# Patient Record
Sex: FEMALE | Race: WHITE
[De-identification: names, ages, dates, MRNs, and addresses within clinical notes are randomized per-mention and may not be internally consistent; named-entity substitution may affect disease eponyms.]

---

## 2017-05-06 ENCOUNTER — HOSPITAL ENCOUNTER (EMERGENCY)
Dept: HOSPITAL 75 - ER | Age: 67
Discharge: HOME | End: 2017-05-06
Payer: COMMERCIAL

## 2017-05-06 VITALS — BODY MASS INDEX: 29.88 KG/M2 | HEIGHT: 64 IN | WEIGHT: 175 LBS

## 2017-05-06 VITALS — SYSTOLIC BLOOD PRESSURE: 139 MMHG | DIASTOLIC BLOOD PRESSURE: 71 MMHG

## 2017-05-06 DIAGNOSIS — T40.0X5A: ICD-10-CM

## 2017-05-06 DIAGNOSIS — F17.210: ICD-10-CM

## 2017-05-06 DIAGNOSIS — R42: Primary | ICD-10-CM

## 2017-05-06 DIAGNOSIS — Z79.82: ICD-10-CM

## 2017-05-06 DIAGNOSIS — Z79.899: ICD-10-CM

## 2017-05-06 DIAGNOSIS — R11.0: ICD-10-CM

## 2017-05-06 DIAGNOSIS — I10: ICD-10-CM

## 2017-05-06 LAB
ALBUMIN SERPL-MCNC: 4.3 G/DL (ref 3.2–4.5)
ALT SERPL-CCNC: 21 U/L (ref 0–55)
ANION GAP SERPL CALC-SCNC: 9 MMOL/L (ref 5–14)
AST SERPL-CCNC: 23 U/L (ref 5–34)
BASOPHILS # BLD AUTO: 0 10^3/UL (ref 0–0.1)
BASOPHILS NFR BLD AUTO: 0 % (ref 0–10)
BILIRUB SERPL-MCNC: 0.4 MG/DL (ref 0.1–1)
BILIRUB UR QL STRIP: NEGATIVE
BUN SERPL-MCNC: 19 MG/DL (ref 7–18)
BUN/CREAT SERPL: 26
CALCIUM SERPL-MCNC: 10 MG/DL (ref 8.5–10.1)
CHLORIDE SERPL-SCNC: 102 MMOL/L (ref 98–107)
CO2 SERPL-SCNC: 27 MMOL/L (ref 21–32)
CREAT SERPL-MCNC: 0.73 MG/DL (ref 0.6–1.3)
EOSINOPHIL # BLD AUTO: 0 10^3/UL (ref 0–0.3)
EOSINOPHIL NFR BLD AUTO: 0 % (ref 0–10)
ERYTHROCYTE [DISTWIDTH] IN BLOOD BY AUTOMATED COUNT: 14.6 % (ref 10–14.5)
GFR SERPLBLD BASED ON 1.73 SQ M-ARVRAT: > 60 ML/MIN
GLUCOSE SERPL-MCNC: 118 MG/DL (ref 70–105)
KETONES UR QL STRIP: NEGATIVE
LEUKOCYTE ESTERASE UR QL STRIP: (no result)
LYMPHOCYTES # BLD AUTO: 1.1 X 10^3 (ref 1–4)
LYMPHOCYTES NFR BLD AUTO: 13 % (ref 12–44)
MCH RBC QN AUTO: 29 PG (ref 25–34)
MCHC RBC AUTO-ENTMCNC: 34 G/DL (ref 32–36)
MCV RBC AUTO: 86 FL (ref 80–99)
MONOCYTES # BLD AUTO: 0.3 X 10^3 (ref 0–1)
MONOCYTES NFR BLD AUTO: 3 % (ref 0–12)
NEUTROPHILS # BLD AUTO: 6.9 X 10^3 (ref 1.8–7.8)
NEUTROPHILS NFR BLD AUTO: 83 % (ref 42–75)
NITRITE UR QL STRIP: NEGATIVE
PH UR STRIP: 6 [PH] (ref 5–9)
PLATELET # BLD: 211 10^3/UL (ref 130–400)
PMV BLD AUTO: 9.8 FL (ref 7.4–10.4)
POTASSIUM SERPL-SCNC: 4.2 MMOL/L (ref 3.6–5)
PROT SERPL-MCNC: 7.3 G/DL (ref 6.4–8.2)
PROT UR QL STRIP: NEGATIVE
RBC # BLD AUTO: 5.23 10^6/UL (ref 4.35–5.85)
SODIUM SERPL-SCNC: 138 MMOL/L (ref 135–145)
SP GR UR STRIP: 1.02 (ref 1.02–1.02)
SQUAMOUS #/AREA URNS HPF: (no result) /HPF
UROBILINOGEN UR-MCNC: NORMAL MG/DL
WBC # BLD AUTO: 8.3 10^3/UL (ref 4.3–11)
WBC #/AREA URNS HPF: (no result) /HPF

## 2017-05-06 PROCEDURE — 81000 URINALYSIS NONAUTO W/SCOPE: CPT

## 2017-05-06 PROCEDURE — 80053 COMPREHEN METABOLIC PANEL: CPT

## 2017-05-06 PROCEDURE — 73630 X-RAY EXAM OF FOOT: CPT

## 2017-05-06 PROCEDURE — 85025 COMPLETE CBC W/AUTO DIFF WBC: CPT

## 2017-05-06 PROCEDURE — 99283 EMERGENCY DEPT VISIT LOW MDM: CPT

## 2017-05-06 PROCEDURE — 36415 COLL VENOUS BLD VENIPUNCTURE: CPT

## 2017-05-06 NOTE — DIAGNOSTIC IMAGING REPORT
INDICATION: Pain.



3 views were obtained.



FINDINGS: The bones are osteopenic. There is a questionable faint

lucency in the proximal fifth metatarsal. Nondisplaced occult

fracture cannot be excluded. There is also slight cortical

irregularity involving the distal aspect of the proximal phalanx

of the second toe. A nondisplaced fracture in this region cannot

be excluded. There is moderate osteoarthritic change in the first

metatarsophalangeal joint. Soft tissues are unremarkable.



IMPRESSION: Faint lucency to the proximal fifth metatarsal. While

this may be secondary to osteopenia, possibility of an occult

fracture cannot be excluded. Additionally, there is slight

cortical irregularity in the distal aspect of the proximal

phalanx of the left second toe. Nondisplaced fracture in this

region cannot be excluded. Recommend clinical correlation.



Dictated by: 



  Dictated on workstation # [id number]

## 2017-05-06 NOTE — ED GENERAL
General


Chief Complaint:  General Problems/Pain


Stated Complaint:  DIZZINESS/NAUSEA


Nursing Triage Note:  


States that she started taking tamadol yesterday for a fx foot.  States today 


she has become dizzy and nauseated.  her head "just doesn't feel right"


Nursing Sepsis Screen:  No Definite Risk


Source of Information:  Patient, EMS


Exam Limitations:  No Limitations





History of Present Illness


Time Seen by Provider:  16:50


Initial Comments


To ER with reports of dizziness and nausea after taking an Ultram this morning 

for a foot fracture that she sustained on the 17th of last month.  This report 

is from EMS, I spent nearly 10 minutes with the patient and was still unable to 

determine from my conversation with her what her chief complaint is today.


Timing/Duration:  1-2 Days


Severity:  Moderate





Allergies and Home Medications


Allergies


Coded Allergies:  


     Penicillins (Unverified  Allergy, Unknown, PASSED OUT, 5/6/17)





Home Medications


Acetaminophen 500 Mg Tablet, 500 MG PO Q6H PRN, (Reported)


Aspirin 81 Mg Tabec, 81 MG PO DAILY, (Reported)


Atorvastatin Calcium 40 Mg Tablet, #30 (Reported)


Lorazepam 1 Mg Tablet, 1 EACH PO HS PRN, (Reported)


Metoprolol Succinate 25 Mg Tab.sr.24h, 1 EACH PO DAILY, (Reported)


Mirtazapine 15 Mg Tablet, 30 MG PO HS, (Reported)


Quetiapine Fumarate 50 Mg Tablet, 50 MG PO TID, (Reported)


   ONE IN AM, ONE IN AFTERNOON AND TWO AT HS 


Tramadol HCl 50 Mg Tablet, #30 (Reported)





Constitutional:  see HPI


EENTM:  see HPI


Respiratory:  no symptoms reported


Cardiovascular:  no symptoms reported


Gastrointestinal:  nausea


Genitourinary:  no symptoms reported


Musculoskeletal:  no symptoms reported


Skin:  no symptoms reported


Psychiatric/Neurological:  No Symptoms Reported


Hematologic/Lymphatic:  No Symptoms Reported





Past Medical-Social-Family Hx


Patient Social History


Alcohol Use:  Occasionally Uses


Recreational Drug Use:  No


Smoking Status:  Current Everyday Smoker


Type Used:  Cigarettes


2nd Hand Smoke Exposure:  No


Recent Foreign Travel:  No


Contact w/Someone Who Travel:  No


Recent Infectious Disease Expo:  No


Recent Hopitalizations:  No (RIGHT HIP)





Immunizations Up To Date


Tetanus Booster (TDap):  Less than 5yrs


PED Vaccines UTD:  Yes





Surgeries


HX Surgeries:  Yes


Surgeries:  Breast, Orthopedic





Respiratory


Hx Respiratory Disorders:  No





Cardiovascular


Hx Cardiac Disorders:  Yes


Cardiac Disorders:  High Cholesterol, Hypertension





Neurological


Hx Neurological Disorders:  No





Reproductive System


Hx Reproductive Disorders:  No


Sexually Transmitted Disease:  No


HIV/AIDS:  No





Genitourinary


Hx Genitourinary Disorders:  No





Gastrointestinal


Hx Gastrointestinal Disorders:  No





Musculoskeletal


Hx Musculoskeletal Disorders:  Yes (OSTEOARTHRITIS)





Endocrine


Hx Endocrine Disorders:  No





HEENT


HX ENT Disorders:  No





Cancer


Hx Cancer:  No





Psychosocial


Hx Psychiatric Problems:  Yes


Behavioral Health Disorders:  Anxiety





Integumentary


HX Skin/Integumentary Disorder:  No





Blood Transfusions


Hx Blood Disorders:  No





Physical Exam


Vital Signs





Vital Sign - Last 12Hours








 5/6/17





 16:32


 


Temp 97.3


 


Pulse 71


 


Resp 18


 


B/P (MAP) 137/94


 


Pulse Ox 99





Capillary Refill : Less Than 3 Seconds


General Appearance:  No Apparent Distress, WD/WN


Eyes:  Bilateral Eye EOMI, Bilateral Eye Normal Inspection, Bilateral Eye PERRL


HEENT:  PERRL/EOMI, TMs Normal


Neck:  Full Range of Motion, Normal Inspection


Respiratory:  No Accessory Muscle Use, No Respiratory Distress


Cardiovascular:  Regular Rate, Rhythm, Normal Peripheral Pulses


Gastrointestinal:  Normal Bowel Sounds, Non Tender, Soft


Extremity:  Normal Capillary Refill, Non Tender, No Calf Tenderness


Neurologic/Psychiatric:  Alert, Oriented x3, No Motor/Sensory Deficits


Skin:  Normal Color, Warm/Dry





Progress/Results/Core Measures


Results/Orders


Lab Results





Laboratory Tests








Test


  5/6/17


17:05 5/6/17


17:11 Range/Units


 


 


White Blood Count


  8.3 


  


  4.3-11.0


10^3/uL


 


Red Blood Count


  5.23 


  


  4.35-5.85


10^6/uL


 


Hemoglobin 15.2   11.5-16.0  G/DL


 


Hematocrit 45   35-52  %


 


Mean Corpuscular Volume 86   80-99  FL


 


Mean Corpuscular Hemoglobin 29   25-34  PG


 


Mean Corpuscular Hemoglobin


Concent 34 


  


  32-36  G/DL


 


 


Red Cell Distribution Width 14.6 H  10.0-14.5  %


 


Platelet Count


  211 


  


  130-400


10^3/uL


 


Mean Platelet Volume 9.8   7.4-10.4  FL


 


Neutrophils (%) (Auto) 83 H  42-75  %


 


Lymphocytes (%) (Auto) 13   12-44  %


 


Monocytes (%) (Auto) 3   0-12  %


 


Eosinophils (%) (Auto) 0   0-10  %


 


Basophils (%) (Auto) 0   0-10  %


 


Neutrophils # (Auto) 6.9   1.8-7.8  X 10^3


 


Lymphocytes # (Auto) 1.1   1.0-4.0  X 10^3


 


Monocytes # (Auto) 0.3   0.0-1.0  X 10^3


 


Eosinophils # (Auto)


  0.0 


  


  0.0-0.3


10^3/uL


 


Basophils # (Auto)


  0.0 


  


  0.0-0.1


10^3/uL


 


Urine Color  YELLOW   


 


Urine Clarity


  


  SLIGHTLY


CLOUDY  


 


 


Urine pH  6  5-9  


 


Urine Specific Gravity  1.020  1.016-1.022  


 


Urine Protein  NEGATIVE  NEGATIVE  


 


Urine Glucose (UA)  NEGATIVE  NEGATIVE  


 


Urine Ketones  NEGATIVE  NEGATIVE  


 


Urine Nitrite  NEGATIVE  NEGATIVE  


 


Urine Bilirubin  NEGATIVE  NEGATIVE  


 


Urine Urobilinogen  NORMAL  NORMAL  MG/DL


 


Urine Leukocyte Esterase  1+ H NEGATIVE  


 


Urine RBC (Auto)  NEGATIVE  NEGATIVE  


 


Urine RBC  NONE   /HPF


 


Urine WBC  NONE   /HPF


 


Urine Squamous Epithelial


Cells 


  2-5 


   /HPF


 


 


Urine Crystals  NONE   /LPF


 


Urine Bacteria  TRACE   /HPF


 


Urine Casts  NONE   /LPF


 


Urine Mucus  NEGATIVE   /LPF


 


Urine Culture Indicated  NO   








My Orders





Orders - BRANDEN MIDDLETON APRN


Cbc With Automated Diff (5/6/17 16:47)


Comprehensive Metabolic Panel (5/6/17 16:47)


Ua Culture If Indicated (5/6/17 16:47)


Ondansetron  Oral Dissolve Tab (Zofran (5/6/17 17:00)


Foot, Left, 3 Views (5/6/17 17:02)





Medications Given in ED





Current Medications








 Medications  Dose


 Ordered  Sig/Yeimi


 Route  Start Time


 Stop Time Status Last Admin


Dose Admin


 


 Ondansetron HCl  4 mg  ONCE  ONCE


 PO  5/6/17 17:00


 5/6/17 17:01 DC 5/6/17 16:57


4 MG








Vital Signs/I&O





Vital Sign - Last 12Hours








 5/6/17





 16:32


 


Temp 97.3


 


Pulse 71


 


Resp 18


 


B/P (MAP) 137/94


 


Pulse Ox 99














Blood Pressure Mean:  108











Departure


Impression


Impression:  


 Primary Impression:  


 Medication adverse effect


Disposition:  01 HOME, SELF-CARE


Condition:  Stable





Departure-Patient Inst.


Decision time for Depature:  17:36


Referrals:  


DAVID MCGINNIS (PCP)


Primary Care Physician








BRISEYDA LOPEZ MD (Family)


Primary Care Physician


Patient Instructions:  MEDICATION REACTION





Add. Discharge Instructions:  


1.  Return to ER for any concerns


2.  Do not take any more of the Ultram.  All discharge instructions reviewed 

with patient and/or family. Voiced understanding.











BRANDEN MIDDLETON APRN May 6, 2017 16:51

## 2017-05-19 ENCOUNTER — HOSPITAL ENCOUNTER (EMERGENCY)
Dept: HOSPITAL 75 - ER | Age: 67
Discharge: LEFT BEFORE DISCHARGE | End: 2017-05-19
Payer: MEDICARE

## 2017-05-19 VITALS — WEIGHT: 180 LBS | BODY MASS INDEX: 31.89 KG/M2 | HEIGHT: 63 IN
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
## 2017-05-19 DIAGNOSIS — Z79.899: ICD-10-CM

## 2017-05-19 DIAGNOSIS — F10.129: ICD-10-CM

## 2017-05-19 DIAGNOSIS — F17.210: ICD-10-CM

## 2017-05-19 DIAGNOSIS — S92.352D: Primary | ICD-10-CM

## 2017-05-19 DIAGNOSIS — I10: ICD-10-CM

## 2017-05-19 DIAGNOSIS — M19.072: ICD-10-CM
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
## 2017-05-19 DIAGNOSIS — Z79.82: ICD-10-CM

## 2017-05-19 LAB
ALBUMIN SERPL-MCNC: 4.1 G/DL (ref 3.2–4.5)
ALT SERPL-CCNC: 16 U/L (ref 0–55)
ANION GAP SERPL CALC-SCNC: 17 MMOL/L (ref 5–14)
AST SERPL-CCNC: 21 U/L (ref 5–34)
BASOPHILS # BLD AUTO: 0 10^3/UL (ref 0–0.1)
BASOPHILS NFR BLD AUTO: 0 % (ref 0–10)
BILIRUB SERPL-MCNC: 0.2 MG/DL (ref 0.1–1)
BUN SERPL-MCNC: 15 MG/DL (ref 7–18)
BUN/CREAT SERPL: 22
CALCIUM SERPL-MCNC: 9.3 MG/DL (ref 8.5–10.1)
CHLORIDE SERPL-SCNC: 108 MMOL/L (ref 98–107)
CO2 SERPL-SCNC: 18 MMOL/L (ref 21–32)
CREAT SERPL-MCNC: 0.67 MG/DL (ref 0.6–1.3)
EOSINOPHIL # BLD AUTO: 0.2 10^3/UL (ref 0–0.3)
EOSINOPHIL NFR BLD AUTO: 3 % (ref 0–10)
ERYTHROCYTE [DISTWIDTH] IN BLOOD BY AUTOMATED COUNT: 14.8 % (ref 10–14.5)
ETHANOL SERPL-MCNC: 246 MG/DL (ref ?–10)
GFR SERPLBLD BASED ON 1.73 SQ M-ARVRAT: > 60 ML/MIN
GLUCOSE SERPL-MCNC: 114 MG/DL (ref 70–105)
LYMPHOCYTES # BLD AUTO: 2.7 X 10^3 (ref 1–4)
LYMPHOCYTES NFR BLD AUTO: 40 % (ref 12–44)
MCH RBC QN AUTO: 29 PG (ref 25–34)
MCHC RBC AUTO-ENTMCNC: 33 G/DL (ref 32–36)
MCV RBC AUTO: 86 FL (ref 80–99)
MONOCYTES # BLD AUTO: 0.4 X 10^3 (ref 0–1)
MONOCYTES NFR BLD AUTO: 6 % (ref 0–12)
NEUTROPHILS # BLD AUTO: 3.4 X 10^3 (ref 1.8–7.8)
NEUTROPHILS NFR BLD AUTO: 50 % (ref 42–75)
PLATELET # BLD: 233 10^3/UL (ref 130–400)
PMV BLD AUTO: 9.7 FL (ref 7.4–10.4)
POTASSIUM SERPL-SCNC: 3.4 MMOL/L (ref 3.6–5)
PROT SERPL-MCNC: 6.7 G/DL (ref 6.4–8.2)
RBC # BLD AUTO: 5.17 10^6/UL (ref 4.35–5.85)
SODIUM SERPL-SCNC: 143 MMOL/L (ref 135–145)
WBC # BLD AUTO: 6.8 10^3/UL (ref 4.3–11)

## 2017-05-19 PROCEDURE — 80320 DRUG SCREEN QUANTALCOHOLS: CPT

## 2017-05-19 PROCEDURE — 73630 X-RAY EXAM OF FOOT: CPT

## 2017-05-19 PROCEDURE — 85025 COMPLETE CBC W/AUTO DIFF WBC: CPT

## 2017-05-19 PROCEDURE — 80053 COMPREHEN METABOLIC PANEL: CPT

## 2017-05-19 PROCEDURE — 36415 COLL VENOUS BLD VENIPUNCTURE: CPT

## 2017-05-19 PROCEDURE — 99283 EMERGENCY DEPT VISIT LOW MDM: CPT

## 2017-05-19 NOTE — ED LOWER EXTREMITY
General


Chief Complaint:  Substance Abuse


Stated Complaint:  L FOOT PAIN


Source:  patient


Exam Limitations:  no limitations





History of Present Illness


Time seen by provider:  22:00


Initial Comments


Here with report of left foot pain.  States that the medicine that she was 

given for her fracture is not working.  She admits to drinking 3 drinks tonight 

but told EMS that she had 6.  She does admit to being drunk.  She states that 

the tramadol makes her sick and she cannot take it and has not for 2 weeks.  

She apparently broke her foot last month.  She is under the care of Dr. Whitaker.  

She is supposed to wear a boot but does not have that with her currently.  She 

appears to have been walking on her foot.  Denies other injury or concerns.


Onset:  other (3-4 weeks)


Severity:  moderate


Pain/Injury Location:  left foot


Method of Injury:  unknown


Modifying Factors:  Improves With Immobilization, Worse With Movement





Allergies and Home Medications


Allergies


Coded Allergies:  


     Penicillins (Unverified  Allergy, Unknown, PASSED OUT, 5/6/17)





Home Medications


Acetaminophen 500 Mg Tablet, 500 MG PO Q6H PRN, (Reported)


Aspirin 81 Mg Tabec, 81 MG PO DAILY, (Reported)


Atorvastatin Calcium 40 Mg Tablet, #30 (Reported)


Lorazepam 1 Mg Tablet, 1 EACH PO HS PRN, (Reported)


Metoprolol Succinate 25 Mg Tab.sr.24h, 1 EACH PO DAILY, (Reported)


Mirtazapine 15 Mg Tablet, 30 MG PO HS, (Reported)


Quetiapine Fumarate 50 Mg Tablet, 50 MG PO TID, (Reported)


   ONE IN AM, ONE IN AFTERNOON AND TWO AT HS 


Tramadol HCl 50 Mg Tablet, #30 (Reported)





Constitutional:  see HPI, No chills, No fever


Respiratory:  cough, No short of breath


Cardiovascular:  no symptoms reported


Musculoskeletal:  see HPI, No joint swelling, other (left foot pain)


Skin:  no symptoms reported


Psychiatric/Neurological:  Anxiety





Past Medical-Social-Family Hx


Patient Social History


Alcohol Use:  Occasionally Uses


Recreational Drug Use:  No


Smoking Status:  Current Everyday Smoker


Type Used:  Cigarettes


2nd Hand Smoke Exposure:  No


Recent Hopitalizations:  No





Immunizations Up To Date


Tetanus Booster (TDap):  Less than 5yrs


PED Vaccines UTD:  Yes





Surgeries


HX Surgeries:  Yes


Surgeries:  Breast, Orthopedic





Respiratory


Hx Respiratory Disorders:  No





Cardiovascular


Hx Cardiac Disorders:  Yes


Cardiac Disorders:  High Cholesterol, Hypertension





Neurological


Hx Neurological Disorders:  No





Reproductive System


Hx Reproductive Disorders:  No


Sexually Transmitted Disease:  No


HIV/AIDS:  No





Genitourinary


Hx Genitourinary Disorders:  No





Gastrointestinal


Hx Gastrointestinal Disorders:  No





Musculoskeletal


Hx Musculoskeletal Disorders:  Yes (OSTEOARTHRITIS)





Endocrine


Hx Endocrine Disorders:  No





HEENT


HX ENT Disorders:  No





Cancer


Hx Cancer:  No





Psychosocial


Hx Psychiatric Problems:  Yes


Behavioral Health Disorders:  Anxiety





Integumentary


HX Skin/Integumentary Disorder:  No





Blood Transfusions


Hx Blood Disorders:  No





Reviewed Nursing Assessment


Reviewed/Agree w Nursing PMH:  Yes





Physical Exam


Vital Signs





Vital Sign - Last 12Hours








 5/19/17





 22:01


 


Temp 97.1


 


Pulse 73


 


Resp 28


 


B/P (MAP) 124/74


 


Pulse Ox 100


 


O2 Delivery Room Air





Capillary Refill :


General Appearance:  WD/WN, other (animated and somewhat anxious.  Slurred 

speech.)


Neck:  full range of motion, supple


Cardiovascular:  regular rate, rhythm, no murmur


Respiratory:  lungs clear, normal breath sounds


Gastrointestinal:  non tender, soft


Back:  normal inspection, no CVA tenderness, no vertebral tenderness


Feet:  right foot non-tender, right foot normal inspection, right foot normal 

range of motion, left foot pain (lateral aspect), left foot other (no swelling 

or wounds noted.)


Neurologic/Psychiatric:  alert, other (slurred speech but follows commands.  

Smells of alcohol.)


Skin:  normal color, warm/dry





Progress/Results/Core Measures


Results/Orders


Lab Results





Laboratory Tests








Test


  5/19/17


22:20 Range/Units


 


 


White Blood Count


  6.8 


  4.3-11.0


10^3/uL


 


Red Blood Count


  5.17 


  4.35-5.85


10^6/uL


 


Hemoglobin 14.8  11.5-16.0  G/DL


 


Hematocrit 44  35-52  %


 


Mean Corpuscular Volume 86  80-99  FL


 


Mean Corpuscular Hemoglobin 29  25-34  PG


 


Mean Corpuscular Hemoglobin


Concent 33 


  32-36  G/DL


 


 


Red Cell Distribution Width 14.8 H 10.0-14.5  %


 


Platelet Count


  233 


  130-400


10^3/uL


 


Mean Platelet Volume 9.7  7.4-10.4  FL


 


Neutrophils (%) (Auto) 50  42-75  %


 


Lymphocytes (%) (Auto) 40  12-44  %


 


Monocytes (%) (Auto) 6  0-12  %


 


Eosinophils (%) (Auto) 3  0-10  %


 


Basophils (%) (Auto) 0  0-10  %


 


Neutrophils # (Auto) 3.4  1.8-7.8  X 10^3


 


Lymphocytes # (Auto) 2.7  1.0-4.0  X 10^3


 


Monocytes # (Auto) 0.4  0.0-1.0  X 10^3


 


Eosinophils # (Auto)


  0.2 


  0.0-0.3


10^3/uL


 


Basophils # (Auto)


  0.0 


  0.0-0.1


10^3/uL


 


Sodium Level 143  135-145  MMOL/L


 


Potassium Level 3.4 L 3.6-5.0  MMOL/L


 


Chloride Level 108 H   MMOL/L


 


Carbon Dioxide Level 18 L 21-32  MMOL/L


 


Anion Gap 17 H 5-14  MMOL/L


 


Blood Urea Nitrogen 15  7-18  MG/DL


 


Creatinine


  0.67 


  0.60-1.30


MG/DL


 


Estimat Glomerular Filtration


Rate > 60 


   


 


 


BUN/Creatinine Ratio 22   


 


Glucose Level 114 H   MG/DL


 


Calcium Level 9.3  8.5-10.1  MG/DL


 


Total Bilirubin 0.2  0.1-1.0  MG/DL


 


Aspartate Amino Transf


(AST/SGOT) 21 


  5-34  U/L


 


 


Alanine Aminotransferase


(ALT/SGPT) 16 


  0-55  U/L


 


 


Alkaline Phosphatase 88    U/L


 


Total Protein 6.7  6.4-8.2  G/DL


 


Albumin 4.1  3.2-4.5  G/DL


 


Serum Alcohol 246 H <10  MG/DL








My Orders





Orders - OSMAR GUTIERREZ MD


Alcohol (5/19/17 22:08)


Cbc With Automated Diff (5/19/17 22:08)


Comprehensive Metabolic Panel (5/19/17 22:08)


Foot, Left, 3 Views (5/19/17 22:08)





Vital Signs/I&O





Vital Sign - Last 12Hours








 5/19/17





 22:01


 


Temp 97.1


 


Pulse 73


 


Resp 28


 


B/P (MAP) 124/74


 


Pulse Ox 100


 


O2 Delivery Room Air








Progress Note :  


Progress Note


Seen and evaluated.  X-ray left foot.  We will check alcohol level and basic 

labs.  Monitor patient.  2140: Patient does not want to stay and is leaving 

AGAINST MEDICAL ADVICE.  Discharge instructions given.  She was informed to 

follow-up with Dr. Whitaker.  It does appear that she still has a fracture to the 

base of the left fifth metatarsal.  Patient informed.  She has boot at home and 

she was informed to wear this at all times while walking.





Departure


Impression


Impression:  


 Primary Impression:  


 Foot fracture, left


 Qualified Codes:  S92.902G - Unspecified fracture of left foot, subsequent 

encounter for fracture with delayed healing


Disposition:  


Condition:  Stable





Departure-Patient Inst.


Decision time for Depature:  22:44


Referrals:  


[facility] (PCP/Family)


Primary Care Physician








JACKELINE WHITAKER DPM


Patient Instructions:  ALCOHOL AND SUBSTANCE ABUSE





Add. Discharge Instructions:  


All discharge instructions reviewed with patient and/or family. Voiced 

understanding.





You may take Tylenol or ibuprofen per package directions as needed for pain.  

Use walking boot at all times while walking.  Follow-up with Dr. Whitaker on 

Monday morning.  Call his office for appointment.  Return for worse pain or 

other concerns as needed.











OSMAR GUTIERREZ MD May 19, 2017 22:22

## 2017-05-20 NOTE — DIAGNOSTIC IMAGING REPORT
INDICATION: Pain



COMPARISON: 5/6/2017



FINDINGS: 

Three views of the left foot are obtained. There has been no

significant interval change in alignment or position of the

fracture through the proximal fifth metatarsal. Fracture line is

slightly more distinct today, likely due to bony resorption at

the fracture site. No significant callus is demonstrated. No new

fracture is seen. Advanced degenerative change at the first MTP

joint with significant joint space narrowing, subchondral

irregularity and spurring appear similar to the recent prior

study. No significant abnormalities demonstrated.



IMPRESSION:

There is bony resorption about the fracture site through the

proximal fifth metatarsal. This metatarsal fractures otherwise

unchanged in appearance. Severe degenerative changes  at the

first MTP joint are stable. No new abnormality is seen when

compared to the recent prior study.



Dictated by: 



  Dictated on workstation # [id number]

## 2017-07-09 ENCOUNTER — HOSPITAL ENCOUNTER (EMERGENCY)
Dept: HOSPITAL 75 - ER | Age: 67
Discharge: HOME | End: 2017-07-09
Payer: MEDICARE

## 2017-07-09 VITALS — BODY MASS INDEX: 30.12 KG/M2 | HEIGHT: 63 IN | WEIGHT: 170 LBS

## 2017-07-09 VITALS — SYSTOLIC BLOOD PRESSURE: 137 MMHG | DIASTOLIC BLOOD PRESSURE: 73 MMHG

## 2017-07-09 LAB
ALBUMIN SERPL-MCNC: 4 GM/DL (ref 3.2–4.5)
ALT SERPL-CCNC: 25 U/L (ref 0–55)
ANION GAP SERPL CALC-SCNC: 12 MMOL/L (ref 5–14)
AST SERPL-CCNC: 27 U/L (ref 5–34)
BASOPHILS # BLD AUTO: 0 10^3/UL (ref 0–0.1)
BASOPHILS NFR BLD AUTO: 0 % (ref 0–10)
BILIRUB SERPL-MCNC: 0.4 MG/DL (ref 0.1–1)
BILIRUB UR QL STRIP: NEGATIVE
BUN SERPL-MCNC: 21 MG/DL (ref 7–18)
BUN/CREAT SERPL: 28
CALCIUM SERPL-MCNC: 9.3 MG/DL (ref 8.5–10.1)
CHLORIDE SERPL-SCNC: 107 MMOL/L (ref 98–107)
CO2 SERPL-SCNC: 19 MMOL/L (ref 21–32)
CREAT SERPL-MCNC: 0.76 MG/DL (ref 0.6–1.3)
EOSINOPHIL # BLD AUTO: 0 10^3/UL (ref 0–0.3)
EOSINOPHIL NFR BLD AUTO: 0 % (ref 0–10)
ERYTHROCYTE [DISTWIDTH] IN BLOOD BY AUTOMATED COUNT: 15.6 % (ref 10–14.5)
GFR SERPLBLD BASED ON 1.73 SQ M-ARVRAT: > 60 ML/MIN
GLUCOSE SERPL-MCNC: 108 MG/DL (ref 70–105)
KETONES UR QL STRIP: NEGATIVE
LEUKOCYTE ESTERASE UR QL STRIP: (no result)
LYMPHOCYTES # BLD AUTO: 0.9 X 10^3 (ref 1–4)
LYMPHOCYTES NFR BLD AUTO: 8 % (ref 12–44)
MCH RBC QN AUTO: 28 PG (ref 25–34)
MCHC RBC AUTO-ENTMCNC: 33 G/DL (ref 32–36)
MCV RBC AUTO: 85 FL (ref 80–99)
MONOCYTES # BLD AUTO: 0.2 X 10^3 (ref 0–1)
MONOCYTES NFR BLD AUTO: 2 % (ref 0–12)
NEUTROPHILS # BLD AUTO: 9.9 X 10^3 (ref 1.8–7.8)
NEUTROPHILS NFR BLD AUTO: 89 % (ref 42–75)
NEUTS BAND NFR BLD MANUAL: 91 %
NITRITE UR QL STRIP: NEGATIVE
PH UR STRIP: 5 [PH] (ref 5–9)
PLATELET # BLD: 234 10^3/UL (ref 130–400)
PMV BLD AUTO: 9.7 FL (ref 7.4–10.4)
POTASSIUM SERPL-SCNC: 4.1 MMOL/L (ref 3.6–5)
PROT SERPL-MCNC: 7 GM/DL (ref 6.4–8.2)
PROT UR QL STRIP: NEGATIVE
RBC # BLD AUTO: 5.52 10^6/UL (ref 4.35–5.85)
SODIUM SERPL-SCNC: 138 MMOL/L (ref 135–145)
SP GR UR STRIP: 1.02 (ref 1.02–1.02)
SQUAMOUS #/AREA URNS HPF: (no result) /HPF
UROBILINOGEN UR-MCNC: NORMAL MG/DL
VARIANT LYMPHS NFR BLD MANUAL: 6 %
WBC # BLD AUTO: 11.1 10^3/UL (ref 4.3–11)
WBC #/AREA URNS HPF: (no result) /HPF

## 2017-07-09 PROCEDURE — 36415 COLL VENOUS BLD VENIPUNCTURE: CPT

## 2017-07-09 PROCEDURE — 80053 COMPREHEN METABOLIC PANEL: CPT

## 2017-07-09 PROCEDURE — 85007 BL SMEAR W/DIFF WBC COUNT: CPT

## 2017-07-09 PROCEDURE — 81000 URINALYSIS NONAUTO W/SCOPE: CPT

## 2017-07-09 PROCEDURE — 96360 HYDRATION IV INFUSION INIT: CPT

## 2017-07-09 PROCEDURE — 85027 COMPLETE CBC AUTOMATED: CPT

## 2017-07-09 NOTE — ED ABDOMINAL PAIN
General


Chief Complaint:  Abdominal/GI Problems


Stated Complaint:  DIARRHEA/DIZZINESS/NAUSEA/BLOATING


Nursing Triage Note:  


ARRIVED VIA AMB TO ROOM 09 WITHOUT DIFFICULTY. COMPLAINS OF DIARRHEA X2 DAYS.  


STATES SHE HAS BEEN SPRAYING ORTHO HOME DEFENSE FOR COCKROACHES AND WONDERS IF 


IT IS FROM THAT. PT STATES SHE IS ALSO BLOATED.


Sepsis Screen:  No Definite Risk


Source of Information:  Patient


Exam Limitations:  No Limitations





History of Present Illness


Time Seen By Provider:  14:00


Initial Comments


Here with report of diarrhea multiple times daily for the last 3 days.  She was 

a little concerned because she has been spreading for cockroaches at her house 

and she was worried that she may have had some reaction to the bug spray.  She 

said Imodium has not helped.  She is very active and animated and in no other 

distress otherwise.  Denies blood in her urine or stool.  States that water is 

going right through her.


Timing/Duration:  2-3 Days


Severity/Quality:  Moderate, Cramping


Location:  Generalized Abdomen


Modifying Factors:  Worsens With Eating


Associated Symptoms:  No Back Pain, No Chest Pain, No Fever/Chills, No Fatigue, 

No Nausea/Vomiting, No Weakness





Allergies and Home Medications


Allergies


Coded Allergies:  


     Penicillins (Unverified  Allergy, Unknown, PASSED OUT, 5/6/17)





Home Medications


Acetaminophen 500 Mg Tablet, 500 MG PO Q6H PRN, (Reported)


Aspirin 81 Mg Tabec, 81 MG PO DAILY, (Reported)


Atorvastatin Calcium 40 Mg Tablet, #30 (Reported)


Lorazepam 1 Mg Tablet, 1 EACH PO HS PRN, (Reported)


Metoprolol Succinate 25 Mg Tab.sr.24h, 1 EACH PO DAILY, (Reported)


Mirtazapine 15 Mg Tablet, 30 MG PO HS, (Reported)


Quetiapine Fumarate 50 Mg Tablet, 50 MG PO TID, (Reported)


   ONE IN AM, ONE IN AFTERNOON AND TWO AT HS 


Tramadol HCl 50 Mg Tablet, #30 (Reported)





Review of Systems


Constitutional:  see HPI, No chills, No fever


EENTM:  No Symptoms Reported


Respiratory:  No Symptoms Reported


Cardiovascular:  No Symptoms Reported


Gastrointestinal:  See HPI, Abdominal Pain, Diarrhea, Denies Rectal Bleeding, 

Denies Vomiting


Genitourinary:  No Symptoms Reported


Musculoskeletal:  no symptoms reported





All Other Systems Reviewed


Negative Unless Noted:  Yes





Past Medical-Social-Family Hx


Patient Social History


Alcohol Use:  Rarely Uses


Recreational Drug Use:  No


Smoking Status:  Current Everyday Smoker


Type Used:  Cigarettes


2nd Hand Smoke Exposure:  No


Recent Foreign Travel:  No


Contact w/Someone Who Travel:  No


Recent Infectious Disease Expo:  No


Recent Hopitalizations:  No





Immunizations Up To Date


Tetanus Booster (TDap):  Less than 5yrs


PED Vaccines UTD:  Yes





Surgeries


HX Surgeries:  Yes


Surgeries:  Breast, Orthopedic





Respiratory


Hx Respiratory Disorders:  No





Cardiovascular


Hx Cardiac Disorders:  Yes


Cardiac Disorders:  High Cholesterol, Hypertension





Neurological


Hx Neurological Disorders:  No





Reproductive System


Hx Reproductive Disorders:  No


Sexually Transmitted Disease:  No


HIV/AIDS:  No





Genitourinary


Hx Genitourinary Disorders:  No





Gastrointestinal


Hx Gastrointestinal Disorders:  No





Musculoskeletal


Hx Musculoskeletal Disorders:  Yes (OSTEOARTHRITIS)





Endocrine


Hx Endocrine Disorders:  No





HEENT


HX ENT Disorders:  No





Cancer


Hx Cancer:  No





Psychosocial


Hx Psychiatric Problems:  Yes


Behavioral Health Disorders:  Anxiety





Integumentary


HX Skin/Integumentary Disorder:  No





Blood Transfusions


Hx Blood Disorders:  No





Reviewed Nursing Assessment


Reviewed/Agree w Nursing PMH:  Yes





Family Medical History


Significant Family History:  No Pertinent Family Hx





Physical Exam


Vital Signs





 VS - Last 72 Hours, by Label








 7/9/17





 13:40


 


Temp 98.0


 


Pulse 74


 


Resp 16


 


B/P (MAP) 132/79


 


Pulse Ox 96


 


O2 Delivery Room Air





Capillary Refill : Less Than 3 Seconds


General Appearance:  WD/WN, no apparent distress


HEENT:  PERRL/EOMI, pharynx normal


Neck:  full range of motion, supple


Respiratory:  lungs clear, normal breath sounds


Cardiovascular:  regular rate, rhythm, no murmur


Peripheral Pulses:  2+ Dorsalis Pedis (R), 2+ Left Dors-Pedis (L), 2+ Radial 

Pulses (R), 2+ Radial Pulses (L)


Gastrointestinal:  non tender, soft


Extremities:  non-tender, normal inspection


Back:  normal inspection, no CVA tenderness, no vertebral tenderness


Neurologic/Psychiatric:  alert, oriented x 3


Skin:  normal color, warm/dry





Progress/Results/Core Measures


Results/Orders


Lab Results





Laboratory Tests








Test


  7/9/17


14:20 7/9/17


15:16 Range/Units


 


 


White Blood Count


  11.1 H


  


  4.3-11.0


10^3/uL


 


Red Blood Count


  5.52 


  


  4.35-5.85


10^6/uL


 


Hemoglobin 15.6   11.5-16.0  G/DL


 


Hematocrit 47   35-52  %


 


Mean Corpuscular Volume 85   80-99  FL


 


Mean Corpuscular Hemoglobin 28   25-34  PG


 


Mean Corpuscular Hemoglobin


Concent 33 


  


  32-36  G/DL


 


 


Red Cell Distribution Width 15.6 H  10.0-14.5  %


 


Platelet Count


  234 


  


  130-400


10^3/uL


 


Mean Platelet Volume 9.7   7.4-10.4  FL


 


Neutrophils (%) (Auto) 89 H  42-75  %


 


Lymphocytes (%) (Auto) 8 L  12-44  %


 


Monocytes (%) (Auto) 2   0-12  %


 


Eosinophils (%) (Auto) 0   0-10  %


 


Basophils (%) (Auto) 0   0-10  %


 


Neutrophils # (Auto) 9.9 H  1.8-7.8  X 10^3


 


Lymphocytes # (Auto) 0.9 L  1.0-4.0  X 10^3


 


Monocytes # (Auto) 0.2   0.0-1.0  X 10^3


 


Eosinophils # (Auto)


  0.0 


  


  0.0-0.3


10^3/uL


 


Basophils # (Auto)


  0.0 


  


  0.0-0.1


10^3/uL


 


Sodium Level 138   135-145  MMOL/L


 


Potassium Level 4.1   3.6-5.0  MMOL/L


 


Chloride Level 107     MMOL/L


 


Carbon Dioxide Level 19 L  21-32  MMOL/L


 


Anion Gap 12   5-14  MMOL/L


 


Blood Urea Nitrogen 21 H  7-18  MG/DL


 


Creatinine


  0.76 


  


  0.60-1.30


MG/DL


 


Estimat Glomerular Filtration


Rate > 60 


  


   


 


 


BUN/Creatinine Ratio 28    


 


Glucose Level 108 H    MG/DL


 


Calcium Level 9.3   8.5-10.1  MG/DL


 


Total Bilirubin 0.4   0.1-1.0  MG/DL


 


Aspartate Amino Transf


(AST/SGOT) 27 


  


  5-34  U/L


 


 


Alanine Aminotransferase


(ALT/SGPT) 25 


  


  0-55  U/L


 


 


Alkaline Phosphatase 79     U/L


 


Total Protein 7.0   6.4-8.2  GM/DL


 


Albumin 4.0   3.2-4.5  GM/DL


 


Urine Color  YELLOW   


 


Urine Clarity


  


  SLIGHTLY


CLOUDY  


 


 


Urine pH  5  5-9  


 


Urine Specific Gravity  1.020  1.016-1.022  


 


Urine Protein  NEGATIVE  NEGATIVE  


 


Urine Glucose (UA)  NEGATIVE  NEGATIVE  


 


Urine Ketones  NEGATIVE  NEGATIVE  


 


Urine Nitrite  NEGATIVE  NEGATIVE  


 


Urine Bilirubin  NEGATIVE  NEGATIVE  


 


Urine Urobilinogen  NORMAL  NORMAL  MG/DL


 


Urine Leukocyte Esterase  1+ H NEGATIVE  


 


Urine RBC (Auto)  NEGATIVE  NEGATIVE  


 


Urine RBC  NONE   /HPF


 


Urine WBC  0-2   /HPF


 


Urine Squamous Epithelial


Cells 


  5-10 


   /HPF


 


 


Urine Crystals  NONE   /LPF


 


Urine Bacteria  RARE   /HPF


 


Urine Casts  NONE   /LPF


 


Urine Mucus  NEGATIVE   /LPF


 


Urine Culture Indicated  NO   








My Orders





Orders - OSMAR GUTIERREZ MD


Cbc With Automated Diff (7/9/17 14:07)


Comprehensive Metabolic Panel (7/9/17 14:07)


Ua Culture If Indicated (7/9/17 14:07)


Ns Iv 1000 Ml (Sodium Chloride 0.9%) (7/9/17 14:07)


Hyoscyamine Sl Tablet (Levsin Sl Tablet) (7/9/17 14:15)


Saline Lock/Iv-Start (7/9/17 14:07)


Manual Differential (7/9/17 14:20)





Medications Given in ED





Current Medications








 Medications  Dose


 Ordered  Sig/Yeimi


 Route  Start Time


 Stop Time Status Last Admin


Dose Admin


 


 Hyoscyamine


 Sulfate  0.125 mg  ONCE  ONCE


 SL  7/9/17 14:15


 7/9/17 14:16 DC 7/9/17 14:18


0.125 MG








Vital Signs/I&O





Vital Sign - Last 12Hours








 7/9/17





 13:40


 


Temp 98.0


 


Pulse 74


 


Resp 16


 


B/P (MAP) 132/79


 


Pulse Ox 96


 


O2 Delivery Room Air














Blood Pressure Mean:  96








Progress Note :  


Progress Note


Seen and evaluated.  IV, labs and UA.  Normal saline 1 L bolus.  Less than 

0.125 mg by mouth.  Monitor patient.  1550: Patient walking around the ER 

without difficulty.  No report of diarrhea.  Overall feeling much better.  No 

acute findings on laboratory data.  Discharged home with return precautions.  

Patient verbalize understanding instructions and agreement with plan.





Departure


Impression


Impression:  


 Primary Impression:  


 Diarrhea


 Qualified Codes:  R19.7 - Diarrhea, unspecified


 Additional Impression:  


 Diffuse abdominal pain


Disposition:  01 HOME, SELF-CARE


Condition:  Improved





Departure-Patient Inst.


Referrals:  


[facility] (PCP/Family)


Primary Care Physician


Patient Instructions:  Acute Abdomen (Belly Pain), Adult (DC), Diarrhea in 

Adolescents and Adults





Add. Discharge Instructions:  


All discharge instructions reviewed with patient and/or family. Voiced 

understanding.





Drink plenty of fluids.  Eat a light diet that is bland for a few days and then 

advance as tolerated.  Follow-up with your Dr. in one to 2 days for recheck.  

Return for worse pain, fever, vomiting, weakness, breathing problems or other 

concerns as needed.


Scripts


Hyoscyamine Sulfate (Levsin-Sl) 0.125 Mg Tab.subl


0.125 MG SL Q4H, #10 TAB 0 Refills


   Prov: OSMAR GUTIERREZ MD         7/9/17











OSMAR GUTIERREZ MD Jul 9, 2017 14:32

## 2017-07-11 NOTE — XMS REPORT
Continuity of Care Document

 Created on: 2017



GEORGIA HALL

External Reference #: [id number]

: 1950

Sex: Female



Demographics







 Address  [address] 

[place]  [address]

 

 Home Phone  [phone]

 

 Preferred Language  Unknown

 

 Marital Status  Unknown

 

 [employer] Affiliation  Unknown

 

 Race  Unknown

 

 Ethnic Group  Unknown





Author







 Author  [facility]

 

 Organization  [facility]

 

 Address  Unknown

 

 Phone  Unavailable



                                                      



Allergies

                      





 Active                    Description                    Code                  
  Type                    Severity                    Reaction                  
  Onset                    Reported/Identified                    Relationship 
to Patient                    Clinical Status                

 

 Yes                    penicillamine                                         
Drug Allergy                                                                   
                2011                                                     
     

 

 Yes                    penicillamine                                         
Drug Allergy                    N/A                    N/A                     
                    2011                                                 
         

 

 Yes                    amlodipine 5 mg tablet                                 
        Drug Allergy                    N/A                    N/A             
                            2015                                         
                 

 

 Yes                    Penicillins                    [id number]              
      Drug Allergy                    Unknown                    PASSED OUT    
                                     2017                                
                          



                                                                               
                                       



Medications

                                                                               
         



Problems

                      





 Date Dx Coded                    Attending                    Type            
        Code                    Diagnosis                    Diagnosed By      
          

 

 2011                                                              719.45
                    PAIN IN JOINT INVOLVING PELVIC REGION AND THIGH            
                         

 

 2011                    DAVID REYNOLDS                          
               719.45                    PAIN IN JOINT INVOLVING PELVIC REGION 
AND THIGH                                     

 

 2011                                                              719.45
                    PAIN IN JOINT INVOLVING PELVIC REGION AND THIGH            
                         

 

 2011                    DAVID REYNOLDS                          
               719.45                    PAIN IN JOINT INVOLVING PELVIC REGION 
AND THIGH                                     

 

 2011                    TONY PETTIT DO                                
         719.45                    PAIN IN JOINT INVOLVING PELVIC REGION AND 
THIGH                                     

 

 2011                    DAVID REYNOLDS S                          
               719.45                    PAIN IN JOINT INVOLVING PELVIC REGION 
AND THIGH                                     

 

 2011                    ADVID REYNOLDS S                          
               719.45                    PAIN IN JOINT INVOLVING PELVIC REGION 
AND THIGH                                     

 

 2011                    DAVID REYNOLDS                          
               719.45                    PAIN IN JOINT INVOLVING PELVIC REGION 
AND THIGH                                     

 

 2011                    DAVID REYNOLDS S                          
               719.45                    PAIN IN JOINT INVOLVING PELVIC REGION 
AND THIGH                                     

 

 2011                                         Ot                    285.1
                    AC POSTHEMORRHAG ANEMIA                                     

 

 2011                                         Ot                    305.1
                    TOBACCO USE DISORDER                                     

 

 2011                                         Ot                    
715.35                    LOC OSTEOARTH NOS-PELVIS                             
        

 

 10/13/2011                                                              238.2 
                   NEOPLASM OF UNCERTAIN BEHAVIOR OF SKIN                      
               

 

 10/13/2011                    DAVID REYNOLDS                          
               238.2                    NEOPLASM OF UNCERTAIN BEHAVIOR OF SKIN 
                                    

 

 10/13/2011                                                              238.2 
                   NEOPLASM OF UNCERTAIN BEHAVIOR OF SKIN                      
               

 

 10/13/2011                    RAHEL APRN, DAVID S                          
               238.2                    NEOPLASM OF UNCERTAIN BEHAVIOR OF SKIN 
                                    

 

 10/13/2011                    TONY PETTIT DO K                                
         238.2                    NEOPLASM OF UNCERTAIN BEHAVIOR OF SKIN       
                              

 

 10/13/2011                    RAHEL APRN, DAVID S                          
               238.2                    NEOPLASM OF UNCERTAIN BEHAVIOR OF SKIN 
                                    

 

 10/13/2011                    RAHEL APRN, DAVID S                          
               238.2                    NEOPLASM OF UNCERTAIN BEHAVIOR OF SKIN 
                                    

 

 10/13/2011                    RAHEL APRN, DAVID S                          
               238.2                    NEOPLASM OF UNCERTAIN BEHAVIOR OF SKIN 
                                    

 

 10/13/2011                    RAHEL APRN, DAVID S                          
               238.2                    NEOPLASM OF UNCERTAIN BEHAVIOR OF SKIN 
                                    

 

 11/10/2011                                                              706.1 
                   OTHER ACNE                                     

 

 11/10/2011                    RAHEL APRN, DAVID S                          
               706.1                    OTHER ACNE                             
        

 

 11/10/2011                                                              706.1 
                   OTHER ACNE                                     

 

 11/10/2011                    RAHEL APRN, DAVID S                          
               706.1                    OTHER ACNE                             
        

 

 11/10/2011                    TONY PETTIT DO K                                
         706.1                    OTHER ACNE                                   
  

 

 11/10/2011                    RAHEL APRN, DAVID S                          
               706.1                    OTHER ACNE                             
        

 

 11/10/2011                    RAHEL APRN, DAVID S                          
               706.1                    OTHER ACNE                             
        

 

 11/10/2011                    RAHEL APRN, DAVID S                          
               706.1                    OTHER ACNE                             
        

 

 11/10/2011                    RAHEL APRN, DAVID S                          
               706.1                    OTHER ACNE                             
        

 

 2012                                                              300.00
                    anxiety                                     

 

 2012                    RAHEL APRN, DAVID S                          
               300.00                    anxiety                               
      

 

 2012                                                              300.00
                    anxiety                                     

 

 2012                    RAHEL APRN, DAVID S                          
               300.00                    anxiety                               
      

 

 2012                    TONY PETTIT DO K                                
         300.00                    anxiety                                     

 

 2012                    RAHEL APRN, DAVID S                          
               300.00                    anxiety                               
      

 

 2012                    RAHEL APRN, DAVID S                          
               300.00                    anxiety                               
      

 

 2012                    RAHEL APRN, DAVID S                          
               300.00                    anxiety                               
      

 

 2012                    RAHEL APRN, DAVID S                          
               300.00                    anxiety                               
      

 

 2012                                         Ot                    
300.00                    ANXIETY STATE NOS                                     

 

 2012                                         Ot                    305.1
                    TOBACCO USE DISORDER                                     

 

 2012                                         Ot                    
427.69                    PREMATURE BEATS NEC                                  
   

 

 2012                                         Ot                    
427.89                    CARDIAC DYSRHYTHMIAS NEC                             
        

 

 2012                                         Ot                    
715.35                    LOC OSTEOARTH NOS-PELVIS                             
        

 

 2012                                         Ot                    
790.29                    OTHER ABNORMAL GLUCOSE                               
      

 

 2012                                         Ot                    
V17.49                    FAMILY HISTORY OF OTHER CARDIOVASCULAR D             
                        

 

 2012                                         Ot                    
V43.64                    HIP JOINT REPLACEMENT STATUS                         
            

 

 2012                                         Ot                    
275.41                    HYPOCALCEMIA                                     

 

 2012                                         Ot                    285.9
                    ANEMIA NOS                                     

 

 2012                                         Ot                    
296.90                    UNSPECIFIED EPISODIC MOOD DISORDER                   
                  

 

 2012                                         Ot                    
300.00                    ANXIETY STATE NOS                                     

 

 2012                                         Ot                    305.1
                    TOBACCO USE DISORDER                                     

 

 2012                                         Ot                    
715.36                    LOC OSTEOARTH NOS-L/LEG                              
       

 

 2012                                         Ot                    
V43.64                    HIP JOINT REPLACEMENT STATUS                         
            

 

 2012                                         Ot                    
V54.81                    AFTERCARE FOLLOWING JOINT REPLACEMENT                
                     

 

 2012                                         Ot                    V57.1
                    PHYSICAL THERAPY NEC                                     

 

 2012                                         Ot                    
V57.21                    ENCOUNTER FOR OCCUPATIONAL THERAPY                   
                  

 

 10/17/2012                                                              796.2 
                   ELEVATED BLOOD PRESSURE READING WITHOUT DIAGNOSIS OF 
HYPERTENSION                                     

 

 10/17/2012                    BESSIE REYNOLDSA S                          
               796.2                    ELEVATED BLOOD PRESSURE READING WITHOUT 
DIAGNOSIS OF HYPERTENSION                                     

 

 10/17/2012                                                              796.2 
                   ELEVATED BLOOD PRESSURE READING WITHOUT DIAGNOSIS OF 
HYPERTENSION                                     

 

 10/17/2012                    NAIDA REYNOLDSNDA S                          
               796.2                    ELEVATED BLOOD PRESSURE READING WITHOUT 
DIAGNOSIS OF HYPERTENSION                                     

 

 10/17/2012                    TONY PETTIT DO K                                
         796.2                    ELEVATED BLOOD PRESSURE READING WITHOUT 
DIAGNOSIS OF HYPERTENSION                                     

 

 10/17/2012                    NAIDA REYNOLDSNDA S                          
               796.2                    ELEVATED BLOOD PRESSURE READING WITHOUT 
DIAGNOSIS OF HYPERTENSION                                     

 

 10/17/2012                    NAIDA REYNOLDSNDA S                          
               796.2                    ELEVATED BLOOD PRESSURE READING WITHOUT 
DIAGNOSIS OF HYPERTENSION                                     

 

 10/17/2012                    NAIDA REYNOLDSNDA S                          
               796.2                    ELEVATED BLOOD PRESSURE READING WITHOUT 
DIAGNOSIS OF HYPERTENSION                                     

 

 10/17/2012                    NAIDA REYNOLDSNDA S                          
               796.2                    ELEVATED BLOOD PRESSURE READING WITHOUT 
DIAGNOSIS OF HYPERTENSION                                     

 

 2012                    NAIDA REYNOLDSNDA S                          
               272.4                    HYPERLIPIDEMIA                         
            

 

 2012                                                              272.4 
                   HYPERLIPIDEMIA                                     

 

 2012                    NAIDA REYNOLDSNDA S                          
               272.4                    HYPERLIPIDEMIA                         
            

 

 2012                    PETTIT YANIV MASTA K                                
         272.4                    HYPERLIPIDEMIA                               
      

 

 2012                    NAIDA REYNOLDSNDA S                          
               272.4                    HYPERLIPIDEMIA                         
            

 

 2012                    DAVID REYNOLDS S                          
               272.4                    HYPERLIPIDEMIA                         
            

 

 2012                    BESSIE REYNOLDSA S                          
               272.4                    HYPERLIPIDEMIA                         
            

 

 2013                                         Ot                    785.1
                    PALPITATIONS                                     

 

 2013                                         Ot                    
786.09                    RESPIRATORY ABNORM NEC                               
      

 

 2013                                         Ot                    
786.50                    CHEST PAIN NOS                                     

 

 2013                    BESSIE REYNOLDSA S                          
               719.47                    PAIN- FOOT                            
         

 

 2013                    PETTIT TONY MAST LIYA                                
         719.47                    PAIN- FOOT                                  
   

 

 2013                    BESSIE REYNOLDSA S                          
               719.47                    PAIN- FOOT                            
         

 

 2013                    BESSIE REYNOLDSA S                          
               719.47                    PAIN- FOOT                            
         

 

 2013                    BESSIE REYNOLDSA S                          
               719.47                    PAIN- FOOT                            
         

 

 2014                    SILVANO JULES DO LIYA                    Ot           
         300.00                    ANXIETY STATE NOS                           
          

 

 2014                    SILVANO JULES DO LIYA                    Ot           
         786.01                    HYPERVENTILATION                            
         

 

 2014                    SILVANO JULES DO LIYA                    Ot           
         786.05                    SHORTNESS OF BREATH                         
            

 

 2014                    BESSIE REYNOLDSA S                          
               300.4                    DYSTHYMIC DISORDER                     
                

 

 2014                    BESSIE REYNOLDSA S                          
               300.4                    DYSTHYMIC DISORDER                     
                

 

 2014                    BESSIE REYNOLDSA S                          
               300.4                    DYSTHYMIC DISORDER                     
                

 

 2015                    BESSIE REYNOLDSA S                          
               702.19                    SEBORRHEIC KERATOSIS                  
                   

 

 2015                    BESSIE REYNOLDSA S                          
               719.46                    PAIN- KNEE                            
         

 

 2015                    BESSIE REYNOLDSA S                          
               783.1                    WEIGHT GAIN ABNORMAL                   
                  

 

 2015                    BESSIE REYNOLDSA S                          
               702.19                    SEBORRHEIC KERATOSIS                  
                   

 

 2015                    BESSIE REYNOLDSA S                          
               719.46                    PAIN- KNEE                            
         

 

 2015                    BESSIE REYNOLDSA S                          
               783.1                    WEIGHT GAIN ABNORMAL                   
                  

 

 2015                    DAVID REYNOLDS S                          
               564.1                    IRRITABLE BOWEL SYNDROME               
                      

 

 2016                                         Ot                    
715.95                                                          

 

 2016                                         Ot                    
V72.63                                                          

 

 2016                                         Ot                    V74.8
                                                          

 

 2016                                         Ot                    785.1
                                                          

 

 2016                                         Ot                    
786.09                                                          

 

 2016                                         Ot                    
786.50                                                          

 

 2016                                         Ot                    785.1
                                                          

 

 2016                                         Ot                    
786.09                                                          

 

 2016                                         Ot                    
786.50                                                          

 

 2016                    CARRIE SHINE MD                    Ot        
            305.1                                                          

 

 2016                    CARRIE SHINE MD                    Ot        
            401.9                                                          

 

 2016                    CARRIE SHINE MD                    Ot        
            424.0                                                          

 

 2016                    CARRIE SHINE MD                    Ot        
            305.1                                                          

 

 2016                    CARRIE SHINE MD                    Ot        
            401.9                                                          

 

 2016                    CARRIE SHINE MD                    Ot        
            424.0                                                          

 

 2016                                         Ot                    
715.95                    OSTEOARTHROS NOS-PELVIS                              
       

 

 2016                                         Ot                    
V72.63                    PRE-PROCEDURAL LABORATORY EXAMINATION                
                     

 

 2016                                         Ot                    V74.8
                    SCREEN-BACTERIAL DIS NEC                                   
  

 

 2016                                         Ot                    785.1
                    PALPITATIONS                                     

 

 2016                                         Ot                    
786.09                    RESPIRATORY ABNORM NEC                               
      

 

 2016                                         Ot                    
786.50                    CHEST PAIN NOS                                     

 

 2016                                         Ot                    785.1
                    PALPITATIONS                                     

 

 2016                                         Ot                    
786.09                    RESPIRATORY ABNORM NEC                               
      

 

 2016                                         Ot                    
786.50                    CHEST PAIN NOS                                     

 

 2016                    CARRIE SHINE MD                    Ot        
            305.1                    TOBACCO USE DISORDER                      
               

 

 2016                    CARRIE SHINE MD                    Ot        
            401.9                    HYPERTENSION NOS                          
           

 

 2016                    CARRIE SHINE MD                    Ot        
            424.0                    MITRAL VALVE DISORDER                     
                

 

 2016                    CARRIE SHINE MD                    Ot        
            305.1                    TOBACCO USE DISORDER                      
               

 

 2016                    CARRIE SHINE MD                    Ot        
            401.9                    HYPERTENSION NOS                          
           

 

 2016                    CARRIE SHINE MD                    Ot        
            424.0                    MITRAL VALVE DISORDER                     
                

 

 2016                    CARRIE SHINE MD                    Ot        
            305.1                    TOBACCO USE DISORDER                      
               

 

 2016                    CARRIE SHINE MD                    Ot        
            401.9                    HYPERTENSION NOS                          
           

 

 2016                    CARRIE SHINE MD                    Ot        
            424.0                    MITRAL VALVE DISORDER                     
                

 

 2017                                         Ot                    785.1
                    PALPITATIONS                                     

 

 2017                                         Ot                    
786.09                    RESPIRATORY ABNORM NEC                               
      

 

 2017                                         Ot                    
786.50                    CHEST PAIN NOS                                     

 

 2017                    BRANDEN MIDDLETON APRN                    Ot       
             F17.210                    NICOTINE DEPENDENCE, CIGARETTES, 
UNCOMPL                                     

 

 2017                    BRANDEN MIDDLETON APRN                    Ot       
             I10                    ESSENTIAL (PRIMARY) HYPERTENSION           
                          

 

 2017                    BRANDEN MIDDLETON                    Ot       
             R11.0                    NAUSEA                                   
  

 

 2017                    BRANDEN MIDDLETON                    Ot       
             R42                    DIZZINESS AND GIDDINESS                    
                 

 

 2017                    BRANDEN MIDDLETON                    Ot       
             T40.0X5A                    ADVERSE EFFECT OF OPIUM, INITIAL 
ENCOUNT                                     

 

 2017                    BRANDEN MIDDLETON                    Ot       
             Z79.82                    LONG TERM (CURRENT) USE OF ASPIRIN      
                               

 

 2017                    BRANDEN MIDDLETON                    Ot       
             Z79.899                    OTHER LONG TERM (CURRENT) DRUG THERAPY 
                                    

 

 2017                                         Ot                    
715.95                    OSTEOARTHROS NOS-PELVIS                              
       

 

 2017                                         Ot                    
V72.63                    PRE-PROCEDURAL LABORATORY EXAMINATION                
                     

 

 2017                                         Ot                    V74.8
                    SCREEN-BACTERIAL DIS NEC                                   
  

 

 2017                                         Ot                    785.1
                    PALPITATIONS                                     

 

 2017                                         Ot                    
786.09                    RESPIRATORY ABNORM NEC                               
      

 

 2017                                         Ot                    
786.50                    CHEST PAIN NOS                                     

 

 2017                                         Ot                    785.1
                    PALPITATIONS                                     

 

 2017                                         Ot                    
786.09                    RESPIRATORY ABNORM NEC                               
      

 

 2017                                         Ot                    
786.50                    CHEST PAIN NOS                                     

 

 2017                    CARRIE SHINE MD                    Ot        
            305.1                    TOBACCO USE DISORDER                      
               

 

 2017                    CARRIE SHINE MD                    Ot        
            401.9                    HYPERTENSION NOS                          
           

 

 2017                    CARRIE SHINE MD                    Ot        
            424.0                    MITRAL VALVE DISORDER                     
                

 

 2017                    BRANDEN MIDDLETON                    Ot       
             F17.210                    NICOTINE DEPENDENCE, CIGARETTES, 
UNCOMPL                                     

 

 2017                    BRANDEN MIDDLETON                    Ot       
             I10                    ESSENTIAL (PRIMARY) HYPERTENSION           
                          

 

 2017                    BRANDEN MIDDLETON                    Ot       
             R11.0                    NAUSEA                                   
  

 

 2017                    BRANDEN MIDDLETON                    Ot       
             R42                    DIZZINESS AND GIDDINESS                    
                 

 

 2017                    BRANDEN MIDDLETON                    Ot       
             T40.0X5A                    ADVERSE EFFECT OF OPIUM, INITIAL 
ENCOUNT                                     

 

 2017                    BRANDEN MIDDLETON                    Ot       
             Z79.82                    LONG TERM (CURRENT) USE OF ASPIRIN      
                               

 

 2017                    BRANDEN MIDDLETON                    Ot       
             Z79.899                    OTHER LONG TERM (CURRENT) DRUG THERAPY 
                                    

 

 2017                    BRANDEN MIDDLETON                    Ot       
             F17.210                    NICOTINE DEPENDENCE, CIGARETTES, 
UNCOMPL                                     

 

 2017                    BRANDEN MIDDLETON                    Ot       
             I10                    ESSENTIAL (PRIMARY) HYPERTENSION           
                          

 

 2017                    BRANDEN MIDDLETON                    Ot       
             R11.0                    NAUSEA                                   
  

 

 2017                    BRANDEN MIDDLETON                    Ot       
             R42                    DIZZINESS AND GIDDINESS                    
                 

 

 2017                    BRANDEN MIDDLETON                    Ot       
             T40.0X5A                    ADVERSE EFFECT OF OPIUM, INITIAL 
ENCOUNT                                     

 

 2017                    BRANDEN MIDDLETON                    Ot       
             Z79.82                    LONG TERM (CURRENT) USE OF ASPIRIN      
                               

 

 2017                    BRANDEN MIDDLETON                    Ot       
             Z79.899                    OTHER LONG TERM (CURRENT) DRUG THERAPY 
                                    

 

 2017                    OSMAR GUTIERREZ MD, Ot    
                F10.129                    ALCOHOL ABUSE WITH INTOXICATION, 
UNSPECI                                     

 

 2017                    OSMAR GUTIERREZ MD, Ot    
                F17.210                    NICOTINE DEPENDENCE, CIGARETTES, 
UNCOMPL                                     

 

 2017                    OSMAR GUTIERREZ MD, Ot    
                I10                    ESSENTIAL (PRIMARY) HYPERTENSION        
                             

 

 2017                    OSMAR GUTIERREZ MD, Ot    
                M19.072                    PRIMARY OSTEOARTHRITIS, LEFT ANKLE 
AND F                                     

 

 2017                    OSMAR GUTIERREZ MD, Ot    
                S92.352D                    DISP FX OF 5TH METATARSAL BONE, L FT
, 7T                                     

 

 2017                    OSMAR GUTIERREZ MD                    Ot    
                X58.XXXD                    EXPOSURE TO OTHER SPECIFIED FACTORS
, SUB                                     

 

 2017                    OSMAR GUTIERREZ MD, Ot    
                Y90.8                    BLOOD ALCOHOL LEVEL  MG/100 ML 
OR                                      

 

 2017                    OSMAR GUTIERREZ MD                    Ot    
                Y99.8                    OTHER EXTERNAL CAUSE STATUS           
                          

 

 2017                    OSMAR GUTIERREZ MD                    Ot    
                Z79.82                    LONG TERM (CURRENT) USE OF ASPIRIN   
                                  

 

 2017                    OSMAR GUTIERREZ MD                    Ot    
                Z79.899                    OTHER LONG TERM (CURRENT) DRUG 
THERAPY                                     

 

 2017                    OSMAR GUTIERREZ MD, Ot    
                F10.129                    ALCOHOL ABUSE WITH INTOXICATION, 
UNSPECI                                     

 

 2017                    OSMAR GUTIERREZ MD, Ot    
                F17.210                    NICOTINE DEPENDENCE, CIGARETTES, 
UNCOMPL                                     

 

 2017                    OSMAR GUTIERREZ MD                    Ot    
                I10                    ESSENTIAL (PRIMARY) HYPERTENSION        
                             

 

 2017                    OSMAR GUTIERREZ MD                    Ot    
                M19.072                    PRIMARY OSTEOARTHRITIS, LEFT ANKLE 
AND F                                     

 

 2017                    OSMAR GUTIERREZ MD, Ot    
                S92.352D                    DISP FX OF 5TH METATARSAL BONE, L FT
, 7T                                     

 

 2017                    OSMAR GUTIERREZ MD                    Ot    
                X58.XXXD                    EXPOSURE TO OTHER SPECIFIED FACTORS
, SUB                                     

 

 2017                    OSMAR GUTIERREZ MD                    Ot    
                Y90.8                    BLOOD ALCOHOL LEVEL  MG/100 ML 
OR                                      

 

 2017                    OSMAR GUTIERREZ MD                    Ot    
                Y99.8                    OTHER EXTERNAL CAUSE STATUS           
                          

 

 2017                    OSMAR GUTIERREZ MD                    Ot    
                Z79.82                    LONG TERM (CURRENT) USE OF ASPIRIN   
                                  

 

 2017                    OSMAR GUTIERREZ MD                    Ot    
                Z79.899                    OTHER LONG TERM (CURRENT) DRUG 
THERAPY                                     



                                                                               
                                                                               
                                                                               
                                                                               
                                                                               
                                                                               
                                                                               
                                                                               
                                                                               
                                                                               
                                                                               
                                                                               
                                                                               
                                                                               
                                                                               
                                                                               
                                                                               
                                                                               
                                                                               
                                                                               
                                                                               
                                                                               
                                                                               
                                                                               
            



Procedures

                      





 Code                    Description                    Performed By           
         Performed On                

 

                     81.51                                                     
                                                                2011     
           

 

                     81.51                                                     
     TOTAL HIP REPLACEMENT                                                     
      2012                

 

                     81.92                                                     
     INJECTION INTO JOINT                                                      
     2012                

 

                     99.23                                                     
     INJECT STEROID                                                                           

 

                     28872                                                     
     HOLTER MONITOR                                                                           

 

                     45816                                                     
     ROUTINE VENIPUNCTURE                                                      
     2012                

 

                     02589                                                     
     LIVER PANEL (LFT)                                                         
  2012                

 

                     38551                                                     
     LIPID PANEL                                                                           

 

                     90026                                                     
     XRAY ANKLE L, 2 VIEW                                                      
     2013                

 

                     70056                                                     
     XRAY FOOT LEFT COMP MIN 3 VIEWS                                           
                2013                

 

                     97438                                                     
     ROUTINE VENIPUNCTURE                                                      
     2014                

 

                     [id number]                                                   
       GFR CALC (RESULT ONLY)                                                  
         2014                

 

                     63778                                                     
     CMP                                                           2014  
              

 

                     77353                                                     
     LIPID PANEL                                                                           

 

                     Cardiolog                                                 
         Carrie Shine                                                         
  2014                

 

                     60498                                                     
     XRAY KNEE LEFT 3 VIEWS                                                    
       2015                



                                                                               
                                                                               
                                                                                



Results

                      





 Test                    Result                    Range                









 Complete blood count (CBC) with automated white blood cell (WBC) differential 
- 17 17:05                









 Blood leukocytes automated count (number/volume)                    8.3 10*3/
uL                    4.3-11.0                

 

 Blood erythrocytes automated count (number/volume)                    5.23 10*6
/uL                    4.35-5.85                

 

 Venous blood hemoglobin measurement (mass/volume)                    15.2 g/dL
                    11.5-16.0                

 

 Blood hematocrit (volume fraction)                    45 %                    
35-52                

 

 Automated erythrocyte mean corpuscular volume                    86 [foz_us]  
                  80-99                

 

 Automated erythrocyte mean corpuscular hemoglobin (mass per erythrocyte)      
              29 pg                    25-34                

 

 Automated erythrocyte mean corpuscular hemoglobin concentration measurement (
mass/volume)                    34 g/dL                    32-36                

 

 Automated erythrocyte distribution width ratio                    14.6 %      
              10.0-14.5                

 

 Automated blood platelet count (count/volume)                    211 10*3/uL  
                  130-400                

 

 Automated blood platelet mean volume measurement                    9.8 [foz_us
]                    7.4-10.4                

 

 Automated blood neutrophils/100 leukocytes                    83 %            
        42-75                

 

 Automated blood lymphocytes/100 leukocytes                    13 %            
        12-44                

 

 Blood monocytes/100 leukocytes                    3 %                    0-12 
               

 

 Automated blood eosinophils/100 leukocytes                    0 %             
       0-10                

 

 Automated blood basophils/100 leukocytes                    0 %               
     0-10                

 

 Blood neutrophils automated count (number/volume)                    6.9 10*3 
                   1.8-7.8                

 

 Blood lymphocytes automated count (number/volume)                    1.1 10*3 
                   1.0-4.0                

 

 Blood monocytes automated count (number/volume)                    0.3 10*3   
                 0.0-1.0                

 

 Automated eosinophil count                    0.0 10*3/uL                    
0.0-0.3                

 

 Automated blood basophil count (count/volume)                    0.0 10*3/uL  
                  0.0-0.1                









 Comprehensive metabolic panel - 17 17:05                









 Serum or plasma sodium measurement (moles/volume)                    138 mmol/
L                    135-145                

 

 Serum or plasma potassium measurement (moles/volume)                    4.2 
mmol/L                    3.6-5.0                

 

 Serum or plasma chloride measurement (moles/volume)                    102 mmol
/L                                    

 

 Carbon dioxide                    27 mmol/L                    21-32          
      

 

 Serum or plasma anion gap determination (moles/volume)                    9 
mmol/L                    5-14                

 

 Serum or plasma urea nitrogen measurement (mass/volume)                    19 
mg/dL                    7-18                

 

 Serum or plasma creatinine measurement (mass/volume)                    0.73 mg
/dL                    0.60-1.30                

 

 Serum or plasma urea nitrogen/creatinine mass ratio                    26     
                NRG                

 

 Serum or plasma creatinine measurement with calculation of estimated 
glomerular filtration rate                    >                     NRG        
        

 

 Serum or plasma glucose measurement (mass/volume)                    118 mg/dL
                                    

 

 Serum or plasma calcium measurement (mass/volume)                    10.0 mg/
dL                    8.5-10.1                

 

 Serum or plasma total bilirubin measurement (mass/volume)                    
0.4 mg/dL                    0.1-1.0                

 

 Serum or plasma alkaline phosphatase measurement (enzymatic activity/volume)  
                  86 U/L                                    

 

 Serum or plasma aspartate aminotransferase measurement (enzymatic activity/
volume)                    23 U/L                    5-34                

 

 Serum or plasma alanine aminotransferase measurement (enzymatic activity/volume
)                    21 U/L                    0-55                

 

 Serum or plasma protein measurement (mass/volume)                    7.3 g/dL 
                   6.4-8.2                

 

 Serum or plasma albumin measurement (mass/volume)                    4.3 g/dL 
                   3.2-4.5                









 Complete urinalysis with reflex to culture - 17 17:11                









 Urine color determination                    YELLOW                     NRG   
             

 

 Urine clarity determination                    SLIGHTLY CLOUDY                
     NRG                

 

 Urine pH measurement by test strip                    6                     5-
9                

 

 Specific gravity of urine by test strip                    1.020              
       1.016-1.022                

 

 Urine protein assay by test strip, semi-quantitative                    
NEGATIVE                     NEGATIVE                

 

 Urine glucose detection by automated test strip                    NEGATIVE   
                  NEGATIVE                

 

 Erythrocytes detection in urine sediment by light microscopy                  
  NEGATIVE                     NEGATIVE                

 

 Urine ketones detection by automated test strip                    NEGATIVE   
                  NEGATIVE                

 

 Urine nitrite detection by test strip                    NEGATIVE             
        NEGATIVE                

 

 Urine total bilirubin detection by test strip                    NEGATIVE     
                NEGATIVE                

 

 Urine urobilinogen measurement by automated test strip (mass/volume)          
          NORMAL                     NORMAL                

 

 Urine leukocyte esterase detection by dipstick                    1+          
           NEGATIVE                

 

 Automated urine sediment erythrocyte count by microscopy (number/high power 
field)                    NONE                     NRG                

 

 Automated urine sediment leukocyte count by microscopy (number/high power field
)                    NONE                     NRG                

 

 Bacteria detection in urine sediment by light microscopy                    
TRACE                     NRG                

 

 Squamous epithelial cells detection in urine sediment by light microscopy     
               2-5                     NRG                

 

 Crystals detection in urine sediment by light microscopy                    
NONE                     NRG                

 

 Casts detection in urine sediment by light microscopy                    NONE 
                    NRG                

 

 Mucus detection in urine sediment by light microscopy                    
NEGATIVE                     NRG                

 

 Complete urinalysis with reflex to culture                    NO              
       NRG                









 Complete blood count (CBC) with automated white blood cell (WBC) differential 
- 17 22:20                









 Blood leukocytes automated count (number/volume)                    6.8 10*3/
uL                    4.3-11.0                

 

 Blood erythrocytes automated count (number/volume)                    5.17 10*6
/uL                    4.35-5.85                

 

 Venous blood hemoglobin measurement (mass/volume)                    14.8 g/dL
                    11.5-16.0                

 

 Blood hematocrit (volume fraction)                    44 %                    
35-52                

 

 Automated erythrocyte mean corpuscular volume                    86 [foz_us]  
                  80-99                

 

 Automated erythrocyte mean corpuscular hemoglobin (mass per erythrocyte)      
              29 pg                    25-34                

 

 Automated erythrocyte mean corpuscular hemoglobin concentration measurement (
mass/volume)                    33 g/dL                    32-36                

 

 Automated erythrocyte distribution width ratio                    14.8 %      
              10.0-14.5                

 

 Automated blood platelet count (count/volume)                    233 10*3/uL  
                  130-400                

 

 Automated blood platelet mean volume measurement                    9.7 [foz_us
]                    7.4-10.4                

 

 Automated blood neutrophils/100 leukocytes                    50 %            
        42-75                

 

 Automated blood lymphocytes/100 leukocytes                    40 %            
        12-44                

 

 Blood monocytes/100 leukocytes                    6 %                    0-12 
               

 

 Automated blood eosinophils/100 leukocytes                    3 %             
       0-10                

 

 Automated blood basophils/100 leukocytes                    0 %               
     0-10                

 

 Blood neutrophils automated count (number/volume)                    3.4 10*3 
                   1.8-7.8                

 

 Blood lymphocytes automated count (number/volume)                    2.7 10*3 
                   1.0-4.0                

 

 Blood monocytes automated count (number/volume)                    0.4 10*3   
                 0.0-1.0                

 

 Automated eosinophil count                    0.2 10*3/uL                    
0.0-0.3                

 

 Automated blood basophil count (count/volume)                    0.0 10*3/uL  
                  0.0-0.1                









 Comprehensive metabolic panel - 17 22:20                









 Serum or plasma sodium measurement (moles/volume)                    143 mmol/
L                    135-145                

 

 Serum or plasma potassium measurement (moles/volume)                    3.4 
mmol/L                    3.6-5.0                

 

 Serum or plasma chloride measurement (moles/volume)                    108 mmol
/L                                    

 

 Carbon dioxide                    18 mmol/L                    21-32          
      

 

 Serum or plasma anion gap determination (moles/volume)                    17 
mmol/L                    5-14                

 

 Serum or plasma urea nitrogen measurement (mass/volume)                    15 
mg/dL                    7-18                

 

 Serum or plasma creatinine measurement (mass/volume)                    0.67 mg
/dL                    0.60-1.30                

 

 Serum or plasma urea nitrogen/creatinine mass ratio                    22     
                NRG                

 

 Serum or plasma creatinine measurement with calculation of estimated 
glomerular filtration rate                    >                     NRG        
        

 

 Serum or plasma glucose measurement (mass/volume)                    114 mg/dL
                                    

 

 Serum or plasma calcium measurement (mass/volume)                    9.3 mg/dL
                    8.5-10.1                

 

 Serum or plasma total bilirubin measurement (mass/volume)                    
0.2 mg/dL                    0.1-1.0                

 

 Serum or plasma alkaline phosphatase measurement (enzymatic activity/volume)  
                  88 U/L                                    

 

 Serum or plasma aspartate aminotransferase measurement (enzymatic activity/
volume)                    21 U/L                    5-34                

 

 Serum or plasma alanine aminotransferase measurement (enzymatic activity/volume
)                    16 U/L                    0-55                

 

 Serum or plasma protein measurement (mass/volume)                    6.7 g/dL 
                   6.4-8.2                

 

 Serum or plasma albumin measurement (mass/volume)                    4.1 g/dL 
                   3.2-4.5                









 Serum or plasma ethanol measurement (mass/volume) - 17 22:20            
    









 Serum or plasma ethanol measurement (mass/volume)                    246 mg/dL
                    <10                



                                                                               
                                                 



Encounters

                      





 ACCT No.                    Visit Date/Time                    Discharge      
              Status                    Pt. Type                    Provider   
                 Facility                    Loc./Unit                    
Complaint                

 

 [id number]                    2017 22:02:00                    2017 22:48:00                    DIS                    Emergency              
      MATT HUNT, OSMAR BOYKIN                    [facility]                    ER                    L FOOT PAIN                

 

 [id number]                    2017 16:25:00                    2017 18:00:00                    DIS                    Emergency              
      BRANDEN MIDDLETON APRN                    [facility]  
                  ER                    DIZZINESS/NAUSEA                

 

 [id number]                    2014 16:16:00                    2014 19:50:00                    DIS                    Emergency              
      SILVANO JULES DO                    [facility]      
              ER                    SOA,WEAKNESS                

 

 [id number]                    2014 13:54:00                    2014 23:59:59                    [facility]                    Outpatient             
       JESSICA HUNT, CARRIE HO                    [facility]  
                  CARD                    HTN                

 

 [id number]                    2013 13:00:00                           
                                   Document Registration                       
                                                                             

 

 [id number]                    2012 10:47:00                           
                                   Document Registration                       
                                                                             

 

 [id number]                    10/23/2012 13:07:00                           
                                   Document Registration                       
                                                                             

 

 [id number]                    2012 13:40:00                           
                                   Document Registration                       
                                                                             

 

 [id number]                    2012 05:52:00                           
                                   Document Registration                       
                                                                             

 

 [id number]                    2012 10:15:00                           
                                   Document Registration                       
                                                                             

 

 [id number]                    2011 06:04:00                           
                                   Document Registration

## 2018-07-17 ENCOUNTER — HOSPITAL ENCOUNTER (OUTPATIENT)
Dept: HOSPITAL 75 - CARD | Age: 68
End: 2018-07-17
Attending: INTERNAL MEDICINE
Payer: MEDICARE

## 2018-07-17 DIAGNOSIS — I49.3: ICD-10-CM

## 2018-07-17 DIAGNOSIS — I10: Primary | ICD-10-CM

## 2018-07-17 DIAGNOSIS — R60.9: ICD-10-CM

## 2018-07-17 DIAGNOSIS — F41.9: ICD-10-CM

## 2018-07-17 PROCEDURE — 93306 TTE W/DOPPLER COMPLETE: CPT

## 2021-04-21 ENCOUNTER — HOSPITAL ENCOUNTER (OUTPATIENT)
Dept: HOSPITAL 75 - RAD | Age: 71
End: 2021-04-21
Attending: INTERNAL MEDICINE
Payer: MEDICARE

## 2021-04-21 DIAGNOSIS — Z12.2: Primary | ICD-10-CM

## 2021-04-21 DIAGNOSIS — F17.210: ICD-10-CM

## 2021-04-21 PROCEDURE — 71271 CT THORAX LUNG CANCER SCR C-: CPT

## 2021-04-21 NOTE — DIAGNOSTIC IMAGING REPORT
CT  lung screening.



INDICATION: 34 pack year smoking history for low-dose CT

screening.



TECHNIQUE: Noncontrast, low-dose CT imaging performed according

to the lung cancer screening protocol. Auto Exposure Controls

were utilize during the CT exam to meet ALARA standards for

radiation dose reduction.



COMPARISON: CT angiogram chest 05/11/2014.



FINDINGS: No dominant lung mass or suspicious pulmonary nodule.

There is no finding suggestive of CT evidence for lung cancer in

this patient. There is no thoracic lymphadenopathy. No effusion

or pneumothorax. The visualized upper abdomen shows low-density

fat-containing bilateral adrenal adenomas or hyperplasia, stable

from prior. No acute chest wall pathology.



IMPRESSION:No mass or finding of lung cancer. No acute appearing

abnormality. 



LUNG-RADS CATEGORY: Category 1

MODIFIER: None

OTHER SIGNIFICANT FINDINGS:Chronic benign adrenal nodules. 



Dictated by: 



  Dictated on workstation # [id number]

## 2022-10-12 ENCOUNTER — HOSPITAL ENCOUNTER (OUTPATIENT)
Dept: HOSPITAL 75 - CARD | Age: 72
End: 2022-10-12
Attending: INTERNAL MEDICINE
Payer: MEDICARE

## 2022-10-12 VITALS — SYSTOLIC BLOOD PRESSURE: 116 MMHG | DIASTOLIC BLOOD PRESSURE: 77 MMHG

## 2022-10-12 VITALS — WEIGHT: 189.6 LBS | HEIGHT: 61.81 IN | BODY MASS INDEX: 34.89 KG/M2

## 2022-10-12 DIAGNOSIS — I25.10: Primary | ICD-10-CM

## 2022-10-12 DIAGNOSIS — I10: ICD-10-CM

## 2022-10-12 PROCEDURE — 78452 HT MUSCLE IMAGE SPECT MULT: CPT

## 2022-10-12 PROCEDURE — 93017 CV STRESS TEST TRACING ONLY: CPT

## 2022-10-12 NOTE — CARDIOLOGY STRESS TEST REPORT
Stress Test Report


Date of Procedure/Referring:


Date of Procedure:  Oct 12, 2022


PCP


Dinesh Beck MD


Admitting Physician


Admitting Physician:


 








Attending Physician:


Briseyda Shine MD





Indications:


CP





Baseline Heart Rate:


75





Baseline Blood Pressure:


Blood Pressure Systolic:  116


Blood Pressure Diastolic:  77


Baseline Vitals





Vital Signs








  Date Time  Temp Pulse Resp B/P (MAP) Pulse Ox O2 Delivery O2 Flow Rate FiO2


 


10/12/22 13:52  75  116/77 (90) 92   











Baseline EKG:


Baseline EKG:  NSR





Summary


After explaining the procedure to the patient, she  signed a consent and then 

brought to the stress nuclear laboratory.


Patient received 0.4 mg Lexiscan for stress test, ECG, heart rate and blood 

pressure were monitored continuously.  Resting and stress dose of radio tracer 

were injected, imaging was acquired and reviewed in short axis, horizontal long 

axis and vertical long axis views.


TID:  1.08


SSS:  5


SDS:  1


EF:  84


1.  Patient tolerated Lexiscan well


2.  Breast attenuation with typical female pattern, no significant ischemia or 

infarction on SPECT images


3.  Normal left ventricular size, ejection fraction 84%





Copy


Copies To 1:   [facility] [place]











BRISEYDA SHINE MD              Oct 12, 2022 15:16

## 2022-10-31 ENCOUNTER — HOSPITAL ENCOUNTER (OUTPATIENT)
Dept: HOSPITAL 75 - CARD | Age: 72
End: 2022-10-31
Attending: INTERNAL MEDICINE
Payer: MEDICARE

## 2022-10-31 DIAGNOSIS — I11.9: Primary | ICD-10-CM

## 2022-10-31 PROCEDURE — 93306 TTE W/DOPPLER COMPLETE: CPT

## 2022-11-14 ENCOUNTER — HOSPITAL ENCOUNTER (OUTPATIENT)
Dept: HOSPITAL 75 - RAD | Age: 72
End: 2022-11-14
Attending: ORTHOPAEDIC SURGERY
Payer: MEDICARE

## 2022-11-14 DIAGNOSIS — M17.12: Primary | ICD-10-CM

## 2022-11-14 DIAGNOSIS — M71.22: ICD-10-CM

## 2022-11-14 PROCEDURE — 73200 CT UPPER EXTREMITY W/O DYE: CPT

## 2022-11-14 NOTE — DIAGNOSTIC IMAGING REPORT
EXAM: 

CT left knee without contrast.



DATE: 

November 14, 2022.



INDICATION: 

72-year-old female, left knee pain. Surgical planning.



COMPARISON: 

None.



TECHNIQUE: 

Axial CT images of the knee without contrast were obtained. Axial

CT images at the level of the hip and ankle were also obtained

for measurements of femoral version and tibial torsion and

surgical planning.



All CT scans use one or more of the following dose optimizing

techniques: automated exposure control, MA and/or KvP adjustment

based on patient size and exam type or iterative reconstruction. 





FINDINGS:

There are limitations of the exam for diagnostic assessment given

axial only imaging. This exam is primarily for surgical planning.



There is a left total hip prosthesis with associated hardware

related artifact. There is a sclerotic lesion in the left distal

femur, most likely reflecting a bone infarct or enchondroma.

There is moderate to severe patellofemoral compartment joint

space loss with small osteophytes. The medial and lateral

compartments are not well assessed on axial only imaging. There

is a small to moderate sized knee joint effusion.



There is a Baker's cyst.



IMPRESSION:

1. Advanced arthritis of the left knee.

2. Baker's cyst.



Dictated by: 



  Dictated on workstation # [id number]

## 2022-11-21 NOTE — HISTORY AND PHYSICAL
DATE OF SERVICE: 11/30/2022



ADMISSION HISTORY AND PHYSICAL



DATE OF ADMISSION:

11/30/2022.



This will be for inpatient admission on 11/30/2022 for left total knee 

arthroplasty.  The patient will require regular inpatient admission due to 

comorbidities and need for physical therapy, need for pain management.



HISTORY OF PRESENT ILLNESS:

The patient is a 72-year-old female with longstanding left knee pain.  She also 

has a history of lumbar radiculopathy.  She has had bilateral hip replacements. 

She underwent an epidural injection, which did help with her back pain and 

radicular symptoms, but she continues to have left knee pain.  She describes 

pain laterally.  She describes it as sharp at times, but other times.  She has 

undergone treatment with steroid injection as well as Synvisc with only 

temporary relief of her symptoms due to functional impairment and failure to 

improve with conservative measures, the patient elected to proceed with surgical

intervention.



REVIEW OF SYSTEMS:

No chest pain, no shortness of breath, and no dysuria.



PAST MEDICAL HISTORY:

Inflammatory arthritis.



PAST SURGICAL HISTORY:

Breast reduction, bilateral total hip arthroplasties, tonsillectomy and ankle.



FAMILY HISTORY:

Significant for diabetes, ischemic heart disease.



Primary care provider is Dr. Beck.



MEDICATIONS:

Aspirin, hydrochlorothiazide, cyclobenzaprine, Naprosyn.



ALLERGIES:

PENICILLIN, AMLODIPINE, TRAMADOL.



SOCIAL HISTORY:

The patient smokes a quarter pack of cigarettes a day.  Reports social alcohol 

use.



Radiographs reveal valgus alignment.  She has marked lateral joint space 

narrowing and complete loss of patellofemoral joint space.



PHYSICAL EXAMINATION:

GENERAL:  The patient is well-developed, well-nourished, in no acute distress.

HEENT:  Normocephalic, atraumatic.  Pupils equal, round, react to light.  

Oropharynx is clear.

NECK:  Supple.  No lymphadenopathy.

LUNGS:  Clear to auscultation bilaterally.

HEART:  Regular rate and rhythm.

ABDOMEN:  Soft, nontender, nondistended.

EXTREMITIES:  The left knee demonstrates valgus alignment.  She is markedly 

tender along the medial joint line.  She has pain medially with Jared's.  

There is no varus or valgus laxity.  Negative anterior and posterior drawer.  

Range of motion 0/2/120.  Negative straight leg raise.  No pain with hip range 

of motion.



IMPRESSION:

Severe left knee osteoarthritis.



PLAN:

Left total knee arthroplasty.  The risks, benefits, options, indications of 

recovery have been discussed at length with the patient.  She understands and 

wishes to proceed.





Job ID: [id number]

DocumentID: [id number]

Dictated Date: 11/14/2022 11:02:38

Transcription Date: 11/14/2022 11:31:00

Dictated By: GUSTAVO WOODWARD MD

## 2022-11-22 ENCOUNTER — HOSPITAL ENCOUNTER (OUTPATIENT)
Dept: HOSPITAL 75 - PREOP | Age: 72
End: 2022-11-22
Attending: ORTHOPAEDIC SURGERY
Payer: MEDICARE

## 2022-11-22 VITALS — WEIGHT: 185.19 LBS | BODY MASS INDEX: 34.96 KG/M2 | HEIGHT: 60.98 IN

## 2022-11-22 VITALS — SYSTOLIC BLOOD PRESSURE: 132 MMHG | DIASTOLIC BLOOD PRESSURE: 66 MMHG

## 2022-11-22 DIAGNOSIS — M17.12: ICD-10-CM

## 2022-11-22 DIAGNOSIS — Z01.818: Primary | ICD-10-CM

## 2022-11-22 LAB
ALBUMIN SERPL-MCNC: 4.1 GM/DL (ref 3.2–4.5)
ALP SERPL-CCNC: 102 U/L (ref 40–136)
ALT SERPL-CCNC: 15 U/L (ref 0–55)
APTT PPP: YELLOW S
BACTERIA #/AREA URNS HPF: (no result) /HPF
BASOPHILS # BLD AUTO: 0 10^3/UL (ref 0–0.1)
BASOPHILS NFR BLD AUTO: 1 % (ref 0–10)
BILIRUB SERPL-MCNC: 0.7 MG/DL (ref 0.1–1)
BILIRUB UR QL STRIP: NEGATIVE
BUN/CREAT SERPL: 20
CALCIUM SERPL-MCNC: 9.6 MG/DL (ref 8.5–10.1)
CHLORIDE SERPL-SCNC: 102 MMOL/L (ref 98–107)
CO2 SERPL-SCNC: 23 MMOL/L (ref 21–32)
CREAT SERPL-MCNC: 0.65 MG/DL (ref 0.6–1.3)
EOSINOPHIL # BLD AUTO: 0.2 10^3/UL (ref 0–0.3)
EOSINOPHIL NFR BLD AUTO: 2 % (ref 0–10)
ERYTHROCYTE [SEDIMENTATION RATE] IN BLOOD: 4 MM/HR (ref 0–30)
FIBRINOGEN PPP-MCNC: (no result) MG/DL
GFR SERPLBLD BASED ON 1.73 SQ M-ARVRAT: 93 ML/MIN
GLUCOSE SERPL-MCNC: 108 MG/DL (ref 70–105)
GLUCOSE UR STRIP-MCNC: NEGATIVE MG/DL
HCT VFR BLD CALC: 48 % (ref 35–52)
HGB BLD-MCNC: 16.1 G/DL (ref 11.5–16)
INR PPP: 0.9 (ref 0.8–1.4)
KETONES UR QL STRIP: NEGATIVE
LEUKOCYTE ESTERASE UR QL STRIP: NEGATIVE
LYMPHOCYTES # BLD AUTO: 2.1 10^3/UL (ref 1–4)
LYMPHOCYTES NFR BLD AUTO: 30 % (ref 12–44)
MANUAL DIFFERENTIAL PERFORMED BLD QL: NO
MCH RBC QN AUTO: 29 PG (ref 25–34)
MCHC RBC AUTO-ENTMCNC: 33 G/DL (ref 32–36)
MCV RBC AUTO: 86 FL (ref 80–99)
MONOCYTES # BLD AUTO: 0.4 10^3/UL (ref 0–1)
MONOCYTES NFR BLD AUTO: 7 % (ref 0–12)
NEUTROPHILS # BLD AUTO: 4.1 10^3/UL (ref 1.8–7.8)
NEUTROPHILS NFR BLD AUTO: 60 % (ref 42–75)
NITRITE UR QL STRIP: POSITIVE
PH UR STRIP: 8 [PH] (ref 5–9)
PLATELET # BLD: 214 10^3/UL (ref 130–400)
PMV BLD AUTO: 9.6 FL (ref 9–12.2)
POTASSIUM SERPL-SCNC: 4.1 MMOL/L (ref 3.6–5)
PROT SERPL-MCNC: 7.5 GM/DL (ref 6.4–8.2)
PROT UR QL STRIP: NEGATIVE
PROTHROMBIN TIME: 12.9 SEC (ref 12.2–14.7)
RBC #/AREA URNS HPF: (no result) /HPF
SODIUM SERPL-SCNC: 139 MMOL/L (ref 135–145)
SP GR UR STRIP: 1.01 (ref 1.02–1.02)
SQUAMOUS #/AREA URNS HPF: (no result) /HPF
WBC # BLD AUTO: 6.8 10^3/UL (ref 4.3–11)
WBC #/AREA URNS HPF: (no result) /HPF

## 2022-11-22 PROCEDURE — 82308 ASSAY OF CALCITONIN: CPT

## 2022-11-22 PROCEDURE — 85025 COMPLETE CBC W/AUTO DIFF WBC: CPT

## 2022-11-22 PROCEDURE — 80053 COMPREHEN METABOLIC PANEL: CPT

## 2022-11-22 PROCEDURE — 93005 ELECTROCARDIOGRAM TRACING: CPT

## 2022-11-22 PROCEDURE — 85610 PROTHROMBIN TIME: CPT

## 2022-11-22 PROCEDURE — 87081 CULTURE SCREEN ONLY: CPT

## 2022-11-22 PROCEDURE — 85652 RBC SED RATE AUTOMATED: CPT

## 2022-11-22 PROCEDURE — 81000 URINALYSIS NONAUTO W/SCOPE: CPT

## 2022-11-22 PROCEDURE — 86900 BLOOD TYPING SEROLOGIC ABO: CPT

## 2022-11-22 PROCEDURE — 86850 RBC ANTIBODY SCREEN: CPT

## 2022-11-22 PROCEDURE — 71046 X-RAY EXAM CHEST 2 VIEWS: CPT

## 2022-11-22 PROCEDURE — 86901 BLOOD TYPING SEROLOGIC RH(D): CPT

## 2022-11-22 PROCEDURE — 87088 URINE BACTERIA CULTURE: CPT

## 2022-11-22 PROCEDURE — 36415 COLL VENOUS BLD VENIPUNCTURE: CPT

## 2022-11-22 PROCEDURE — 87077 CULTURE AEROBIC IDENTIFY: CPT

## 2022-11-22 NOTE — DIAGNOSTIC IMAGING REPORT
EXAMINATION: Chest, two views.



HISTORY: Preoperative evaluation.



COMPARISON: None available.



FINDINGS: 



Heart size and pulmonary vasculature are normal. The lungs are

clear without consolidation, pleural effusion, or pneumothorax.

The osseous structures are intact.



IMPRESSION: 



1. No acute radiographic abnormality in the chest.



Dictated by: 



  Dictated on workstation # [id number]

## 2022-11-30 ENCOUNTER — HOSPITAL ENCOUNTER (INPATIENT)
Dept: HOSPITAL 75 - 4TH | Age: 72
LOS: 2 days | Discharge: HOME HEALTH | DRG: 470 | End: 2022-12-02
Attending: ORTHOPAEDIC SURGERY | Admitting: ORTHOPAEDIC SURGERY
Payer: MEDICARE

## 2022-11-30 VITALS — DIASTOLIC BLOOD PRESSURE: 65 MMHG | SYSTOLIC BLOOD PRESSURE: 107 MMHG

## 2022-11-30 VITALS — SYSTOLIC BLOOD PRESSURE: 107 MMHG | DIASTOLIC BLOOD PRESSURE: 65 MMHG

## 2022-11-30 VITALS — DIASTOLIC BLOOD PRESSURE: 51 MMHG | SYSTOLIC BLOOD PRESSURE: 121 MMHG

## 2022-11-30 VITALS — DIASTOLIC BLOOD PRESSURE: 69 MMHG | SYSTOLIC BLOOD PRESSURE: 132 MMHG

## 2022-11-30 VITALS — DIASTOLIC BLOOD PRESSURE: 54 MMHG | SYSTOLIC BLOOD PRESSURE: 112 MMHG

## 2022-11-30 VITALS — SYSTOLIC BLOOD PRESSURE: 109 MMHG | DIASTOLIC BLOOD PRESSURE: 51 MMHG

## 2022-11-30 VITALS — DIASTOLIC BLOOD PRESSURE: 62 MMHG | SYSTOLIC BLOOD PRESSURE: 125 MMHG

## 2022-11-30 VITALS — SYSTOLIC BLOOD PRESSURE: 125 MMHG | DIASTOLIC BLOOD PRESSURE: 58 MMHG

## 2022-11-30 VITALS — SYSTOLIC BLOOD PRESSURE: 132 MMHG | DIASTOLIC BLOOD PRESSURE: 79 MMHG

## 2022-11-30 VITALS — DIASTOLIC BLOOD PRESSURE: 65 MMHG | SYSTOLIC BLOOD PRESSURE: 121 MMHG

## 2022-11-30 VITALS — SYSTOLIC BLOOD PRESSURE: 122 MMHG | DIASTOLIC BLOOD PRESSURE: 97 MMHG

## 2022-11-30 VITALS — HEIGHT: 61.02 IN | WEIGHT: 185.19 LBS | BODY MASS INDEX: 34.96 KG/M2

## 2022-11-30 DIAGNOSIS — M17.12: Primary | ICD-10-CM

## 2022-11-30 DIAGNOSIS — I10: ICD-10-CM

## 2022-11-30 DIAGNOSIS — F17.210: ICD-10-CM

## 2022-11-30 DIAGNOSIS — F41.9: ICD-10-CM

## 2022-11-30 DIAGNOSIS — Z96.643: ICD-10-CM

## 2022-11-30 DIAGNOSIS — J98.11: ICD-10-CM

## 2022-11-30 DIAGNOSIS — E78.00: ICD-10-CM

## 2022-11-30 DIAGNOSIS — R09.02: ICD-10-CM

## 2022-11-30 PROCEDURE — 84145 PROCALCITONIN (PCT): CPT

## 2022-11-30 PROCEDURE — 82805 BLOOD GASES W/O2 SATURATION: CPT

## 2022-11-30 PROCEDURE — 73560 X-RAY EXAM OF KNEE 1 OR 2: CPT

## 2022-11-30 PROCEDURE — 80053 COMPREHEN METABOLIC PANEL: CPT

## 2022-11-30 PROCEDURE — 94664 DEMO&/EVAL PT USE INHALER: CPT

## 2022-11-30 PROCEDURE — 87081 CULTURE SCREEN ONLY: CPT

## 2022-11-30 PROCEDURE — 71045 X-RAY EXAM CHEST 1 VIEW: CPT

## 2022-11-30 PROCEDURE — 85025 COMPLETE CBC W/AUTO DIFF WBC: CPT

## 2022-11-30 PROCEDURE — 0SRD0J9 REPLACEMENT OF LEFT KNEE JOINT WITH SYNTHETIC SUBSTITUTE, CEMENTED, OPEN APPROACH: ICD-10-PCS | Performed by: ORTHOPAEDIC SURGERY

## 2022-11-30 PROCEDURE — 36415 COLL VENOUS BLD VENIPUNCTURE: CPT

## 2022-11-30 PROCEDURE — 94640 AIRWAY INHALATION TREATMENT: CPT

## 2022-11-30 PROCEDURE — 94760 N-INVAS EAR/PLS OXIMETRY 1: CPT

## 2022-11-30 RX ADMIN — SODIUM CHLORIDE SCH MLS/HR: 900 INJECTION, SOLUTION INTRAVENOUS at 18:48

## 2022-11-30 RX ADMIN — SODIUM CHLORIDE SCH MLS/HR: 900 INJECTION, SOLUTION INTRAVENOUS at 10:55

## 2022-11-30 RX ADMIN — CEFUROXIME SCH MLS/HR: 750 INJECTION, POWDER, FOR SOLUTION INTRAMUSCULAR; INTRAVENOUS at 23:44

## 2022-11-30 RX ADMIN — DOCUSATE SODIUM AND SENNOSIDES SCH EA: 8.6; 5 TABLET, FILM COATED ORAL at 20:00

## 2022-11-30 RX ADMIN — ASPIRIN SCH MG: 81 TABLET ORAL at 13:29

## 2022-11-30 RX ADMIN — SODIUM CHLORIDE SCH MLS/HR: 900 INJECTION, SOLUTION INTRAVENOUS at 23:05

## 2022-11-30 RX ADMIN — ENOXAPARIN SODIUM SCH MG: 30 INJECTION SUBCUTANEOUS at 18:44

## 2022-11-30 RX ADMIN — SODIUM CHLORIDE, SODIUM LACTATE, POTASSIUM CHLORIDE, AND CALCIUM CHLORIDE PRN MLS/HR: 600; 310; 30; 20 INJECTION, SOLUTION INTRAVENOUS at 08:14

## 2022-11-30 RX ADMIN — DOCUSATE SODIUM AND SENNOSIDES SCH EA: 8.6; 5 TABLET, FILM COATED ORAL at 13:29

## 2022-11-30 RX ADMIN — ENOXAPARIN SODIUM SCH MG: 30 INJECTION SUBCUTANEOUS at 13:29

## 2022-11-30 RX ADMIN — CEFUROXIME SCH MLS/HR: 750 INJECTION, POWDER, FOR SOLUTION INTRAMUSCULAR; INTRAVENOUS at 16:30

## 2022-11-30 RX ADMIN — SODIUM CHLORIDE, SODIUM LACTATE, POTASSIUM CHLORIDE, AND CALCIUM CHLORIDE PRN MLS/HR: 600; 310; 30; 20 INJECTION, SOLUTION INTRAVENOUS at 07:03

## 2022-11-30 NOTE — OPERATIVE REPORT
DATE OF SERVICE: 11/30/2022



PREOPERATIVE DIAGNOSIS:

Left knee primary osteoarthritis.



POSTOPERATIVE DIAGNOSIS:

Left knee primary osteoarthritis.



PROCEDURE:

Left total knee arthroplasty.



SURGEON:

Rufus George MD



ASSISTANT:

Antoine Daigle, who assisted throughout the procedure and closed the incision.



ANESTHESIA:

General endotracheal by Charan albarado CRNA.



TOURNIQUET TIME:

Approximately 50 minutes at 300 mmHg.



ESTIMATED BLOOD LOSS:

Minimal.



DRAINS:

None.



COMPLICATIONS:

None.



POSTOPERATIVE PLAN:

Routine total knee arthroplasty protocol.



MATERIALS:

MicroPort cemented size 4 femur, cemented size 3 tibia with 10 mm insert and a 

cemented size 29 patellar button.  The patient was transferred to the recovery 

room awake and stable.



CONDITION OF MEDICAL NECESSITY:

The patient is a 72-year-old female with longstanding progressive left knee 

pain.  Radiographs revealed severe lateral and patellofemoral arthrosis.  She 

has tried rest, activity modifications, anti-inflammatories without relief.  Due

to functional impairment and failure to improve with conservative measures, the 

patient elected to proceed with surgical intervention.



DESCRIPTION OF PROCEDURE:

After risks and benefits of the procedure were discussed and questions were 

answered and informed consent was signed and placed on the chart, the operative 

site was confirmed in the preoperative holding area initialed by surgeon.



The patient was then transported to the operating room and after adequate levels

of general endotracheal anesthetic was obtained, timeout was called, confirming 

the operative site.  Examination under anesthesia was performed.  The left lower

extremity was prepped and draped in the usual sterile fashion with the leg 

elevated and the knee flexed, tourniquet was inflated to 300 mmHg.  Standard 

anterior approach was utilized.  Hemostasis was obtained with cautery.  Medial 

parapatellar arthrotomy was performed, leaving a 1 cm cuff on the patella for 

later reattachment.  A portion of the fat pad was resected.  The ACL was 

resected.  A subperiosteal release was performed on the proximal medial tibia, 

being careful to stay on the bony surface.  The custom cutting block was placed 

on the femur, pinned into position just distal cut was made.  The _____  cutting

block was placed at the previously placed drill holes from the custom block and 

cuts were made from posterior to anterior.  A subperiosteal release was then 

carefully performed on the posterior distal femur being careful to stay on the 

bony surface.  The custom cutting block was placed on the tibia.  Excellent 

coverage was obtained.  Drop samina transected the intermalleolar axis.  At the end

of position, the cut was made and the 3 baseplate was placed at the previously 

placed drill holes.  The drop samina transected the intermalleolar access.  This 

was prepared with the drill and keel punch.  The femoral trial was placed and 

the trochlear cut was made.  The patella was then prepared by resecting 10 mm 

off the undersurface.  The peg guide was placed and the peg holes were drilled. 

The 29 trial was placed.  The knee was taken through range of motion.  Full 

extension was easily obtained 120 degrees of flexion with gravity was easily 

obtained.  There was no anterior/posterior or medial/lateral laxity in flexion 

or extension.  The trials were removed.  The joint was irrigated with pulse 

lavage.  The periarticular block was placed and the posterior capsule, medial 

and lateral retinaculum, extensor mechanism, and subcutaneous tissues.  The 

joint was further irrigated.  The bone ends were irrigated.  The tibial 

baseplate was cemented into position.  Excessive cement was removed.  The 

superior surface was irrigated and dried.  The polyethylene insert was placed.  

The distal femur was irrigated and dried and the femoral prosthesis was cemented

into position.  Then, the knee was brought out in full extension until cement 

cured.  Excessive cement was removed.  The undersurface of the patella was 

irrigated and dried.  The patellar button was cemented into position.  Excessive

cement was removed.  Once the cement cured, the knee was taken through range of 

motion.  Full extension was easily obtained 120 degrees of flexion with gravity 

was easily obtained.  There was no anterior/posterior, medial/lateral laxity in 

flexion or extension.  The patella tracked well.  The joint was further 

irrigated with pulse lavage.  The arthrotomy was closed with #2 Tevdek in 

figure-of-eight interrupted fashion.  The knee was flexed.  The repair was 

stable and the patella tracked well.  Subcutaneous tissues were irrigated using 

a total of 6 liters throughout the procedure.  0 Vicryl was used for deep 

subcutaneous layer, 2-0 Vicryl for the superficial subcutaneous layer, staples 

used on the skin.  A soft dressing was applied.  The tourniquet was deflated.  

The patient was transferred to recovery room awake and in stable condition.





Job ID: [id number]

DocumentID: [id number]

Dictated Date: 11/30/2022 09:02:08

Transcription Date: 11/30/2022 14:30:00

Dictated By: RUFUS GEORGE MD

## 2022-11-30 NOTE — D/C HH FACE TO FACE ORDER
D/C  Face to Face Orders


Reconcile Patient Problems


Problems Reviewed?:  Yes





Instructions for Patient


[facility] [place], [phone]


Patient Instructions/FollowUp:  


three weeks


Physician to follow Patient:  three weeks


Discharge Diet for Home:  Regular Diet





Patient Data-Allergies,Ht & Wt


Patient Allergies:  


Coded Allergies:  


     Penicillins (Unverified  Allergy, Unknown, PASSED OUT, 5/6/17)


     amlodipine (Unverified  Allergy, Unknown, 11/22/22)


     tramadol (Unverified  Allergy, Unknown, 11/22/22)


Height (Feet):  5


Height (Inches):  3.00


Weight (Pounds):  170





Home Health Need/Face to Face


Date of Face to Face:  Nov 30, 2022


Clinical Findings:  Muscle weakness, Pain with ambulation, Unsteady gait


I have seen Pt face-to-face:  Yes


Discharged To:  Home


Diagnosis/Conditions:  


left total knee arthroplasty


Patient is Homebound due to:  Muscle weakness, Pain w/ambulation


Homebound Status


   Due to the above stated illness, injury or surgical procedure (medical 

condition or diagnosis) and associated clinical findings, the patient is 

homebound because of his/her inability to leave home except with aid of a 

supportive device and/or person AND leaving the home requires a considerable and

taxing effort or is medically contraindicated.


Pt req the following assistanc:  Walker





Home Health Nursing Orders


Home Health Services Order:  Physical Therapy-Evaluate & Treat





DC left knee staples and apply steri strips 12/14/22





Therapy Orders


Therapy Orders:  Physical Therapy, PT to assess for OT


Therapy Specific Orders:  Eval assistive deivces, Teach enviro 

modifications/safety, Gait training, Increase strength/endurance, Provider 

maintenance therapy, Restore ROM


Certify Stmt


I certify that this patient is under my care and that I, a nurse practitioner or

a physician; a assistant working with me, had a face to face encounter that -

meets the physician face to face encounter requirements with this patient as 

dated.











GUSTAVO WOODWARD MD            Nov 30, 2022 07:08

## 2022-11-30 NOTE — PROGRESS NOTE-PRE OPERATIVE
Pre-Operative Progress Note


Date of Available H&P:  Nov 14, 2022


Date H&P Reviewed:  Nov 30, 2022


Time H&P Reviewed:  07:00


Changes from last HP


none


Pre-Operative Diagnosis:  left knee primary osteoarthritis











GUSTAVO WOODWARD MD            Nov 30, 2022 07:06

## 2022-11-30 NOTE — PHYSICAL THERAPY EVALUATION
PT Evaluation-General


Medical Diagnosis


Admission Date


Nov 30, 2022 at 06:00


Medical Diagnosis:  Left TKA


Onset Date:  Nov 30, 2022





Therapy Diagnosis


Therapy Diagnosis:  Impaired Mobility





Height/Weight


Height (Feet):  5


Height (Inches):  3.00


Weight (Pounds):  170





Precautions


Precautions/Isolations:  Fall Prevention, Standard Precautions





Weight Bear Status


Right Lower Extremity:  Right


Full Weight Bearing


Left Lower Extremity:  Left


Weight Bearing/Tolerated





Referral


Physician:  Teo


Reason for Referral:  Evaluation/Treatment





Medical History


Additional Medical History


Inflammatory arthritis.





PAST MEDICAL HISTORY:


Inflammatory arthritis.





PAST SURGICAL HISTORY:


Breast reduction, bilateral total hip arthroplasties, tonsillectomy and ankle.


Reviewed History:  Yes





Social History


Home:  Single Level


Current Living Status:  Alone


Entry Into Home:  Level Entry





Prior


Prior Level of Function


SCALE: Activities may be completed with or without assistive devices.





6-Indepedent-patient completes the activity by him/herself with no assistance 

from a helper.


5-Set-up or Clean-up Assistance-helper sets up or cleans up; patient completes 

activity. Hilger assists only prior to or  


    following the activity.


4-Supervision or Touching Assistance-helper provides verbal cues and/or 

touching/steadying and/or contact guard assistance as patient completes 

activity. Assistance may be provided   


    throughout the activity or intermittently.


3-Partial/Moderate Assistance-helper does LESS THAN HALF the effort. Hilger 

lifts, holds or supports trunk or limbs, but provides less than half the effort.


2-Substantial/Maximal Assistance-helper does MORE THAN HALF the effort. Hilger 

lifts or holds trunk or limbs and provides more than half the effort.


5-Lizqmamne-luckdb does ALL the effort. Patient does none of the effort to 

complete the activity. Or, the assistance of 2 or more helpers is required for 

the patient to complete the  


    activity.


If activity was not attempted, code reason:


7-Patient Refused.


9-Not Applicable-not attempted and the patient did not perform the activity 

before the current illness, exacerbation or injury.


10-Not Attempted due to Environmental Limitations-(lack of equipment, weather 

restraints, etc.).


88-Not Attempted due to Medical Conditions or Safety Concerns.


Bed Mobility:  6


Transfers (B,C,W/C):  6


Gait:  6


Stairs:  3


Indoor Mobility (Ambulation):  Independent


Stairs:  Needed Some Help


Prior Devices Use:  Walker (only needed walker when knee would hurt a lot)





PT Evaluation-Current


Subjective


Patient in bed pre-tx, reports pain 5/10 in L knee, agrees to PT.





Pt/Family Goals


Return to independence at home.





Objective


Patient Orientation:  Person, Place, Situation


Attachments:  Polar Pack, IV





ROM/Strength


ROM Lower Extremities


left knee flexion 100 degrees, extension +5 degrees


Strength Lower Extremities


RLE grossly 4/5, LLE deferred due to pain





Sensory


Vision:  Functional


Hearing:  Functional


Sensation Right Lower Extremit:  Intact


Sensation Left Lower Extremity:  Intact





Treatment


LE Strengthening (AP x20, Quad Set x20, SLR x20, Heel Slides x20)





Assessment/Needs


Patient feeling nauseous during exercises and threw up liquids several times. 

Unable ambulate and get to the recliner today due to vomiting. Patient doing 

well so far, L knee AROM flexion approx 100 degrees, extension lacking approx 5 

degrees. Patient in bed post-tx with visitor in room, nurse call, phone, tray, 

polar pack on, all needs met.


Rehab Potential:  Fair





PT Long Term Goals


Long Term Goals


PT Long Term Goals Time Frame:  Dec 7, 2022


Roll Left & Right (QC):  6


Sit to Lying (QC):  6


Lying-Sitting on Side/Bed(QC):  6


Sit to Stand (QC):  6


Chair/Bed-to-Chair Xfer(QC):  6


Toilet Transfer (QC):  6


Does the Patient Walk:  Yes


Walk 10 feet (QC):  6


Walk 50ft with 2 Turns (QC):  6


Walk 150 ft (QC):  6





PT Plan


Problem List


Problem List:  Activity Tolerance, Functional Strength, Safety, Balance, Gait, 

Transfer, Bed Mobility, ROM





Treatment/Plan


Treatment Plan:  Continue Plan of Care


Treatment Plan:  Bed Mobility, Education, Functional Activity Andres, Functional 

Strength, Gait, Safety, Therapeutic Exercise, Transfers


Treatment Duration:  Dec 7, 2022


Frequency:  11 times per week


Estimated Hrs Per Day:  .25 hour per day


Patient and/or Family Agrees t:  Yes





Safety Risks/Education


Patient Education:  Correct Positioning, Safety Issues


Teaching Recipient:  Patient


Teaching Methods:  Demonstration, Discussion


Response to Teaching:  Reinforcement Needed





Discharge Recommendations


Plan


Patient will perform bed mobility and transfer training, gait training and 

functional strengthening, balance and endurance training in order to be 

independent at home.


Therapy Discharge Recommendati:  Home & Family, Post Acute PT





Time


Time In:  1301


Time Out:  1330


DATE:  Nov 30, 2022


Total Billed Treatment Time:  30


Total Billed Treatment


1 visit





EVL 15'


EX 15'











VIRGINIA EDWARDS PT                Nov 30, 2022 14:06

## 2022-11-30 NOTE — XMS REPORT
Clinical Summary

                             Created on: 2022



Jessica Sutton

External Reference #: [id number]

: 1950

Sex: Female



Demographics





                          Address                   [address] [place] [address]  [address]

 

                          Home Phone                [phone]

 

                          Preferred Language        English

 

                          Marital Status            

 

                          Cheondoism Affiliation     1077

 

                          Race                      White

 

                          Ethnic Group              Not  or 





Author





                          Author                    [facility]

 

                          Organization              [facility]

 

                          Address                   Unknown

 

                          Phone                     Unavailable







Support





                Name            Relationship    Address         Phone

 

                Nubia Ellis  [employer]            Unknown         [phone]







Care Team Providers





                    Care Team Member Name Role                Phone

 

                    No Pcp, Na          PCP                 Unavailable







Source Comments

Some departments are not documenting in the electronic medical record.  If you d
o not see the information that you expected, contact Release of Information in [facility] Information Management department at [phone] for further assistan
ce in locating additional records.[facility]



Allergies





                                        Comments



                 Active Allergy  Reactions       Severity        Noted Date 

 

                                         



                 Penicillins     SYNCOPE         High            10/01/2015 







Medications





                          End Date                  Status



              Medication   Sig          Dispensed    Refills      Start  



                                         Date  

 

                                                    Active



                 gabapentin (NEURONTIN)  Take 300 mg     0               

02  



                     300 mg capsule      by mouth.           1  

 

                                                    Active



                 hydroCHLOROthiazide  daily.          0                

 



                           (HYDRODIURIL) 25 mg       4  



                                         tablet      

 

                                                    Active



                 naproxen (NAPROSYN) 500  twice daily.    0                 



                           mg tablet                 1  

 

                                                    Active



                 metoprolol XL (TOPROL XL)  daily.          0                 



                           25 mg extended release     3  



                                         tablet      

 

                                                    Active



                 aspirin EC 81 mg tablet  aspirin 81 mg   0                 



                           tablet,delaye             4  



                                         d release     



                                         daily     

 

                                                    Active



                     cetirizine HCl (ZYRTEC  Take  by            0   



                           PO)                       mouth.     







Active Problems





Not on file



Surgical History





   



                 Surgery         Date            Site/Laterality  Comments

 

   



                                         BREAST REDUCTION   

 

   



                           HIP SURGERY               Bilateral 

 

   



                                         TONSILLECTOMY   







Medical History





  



                     Medical History     Date                Comments

 

  



                                         Scoliosis  







Family History





   



                 Medical History  Relation        Name            Comments

 

   



                           Diabetes                  Father  

 

   



                           Heart problem             Father  

 

   



                           Back pain                 Mother  

 

   



                           Heart problem             Mother  

 

   



                           Osteoporosis              Mother  

 

   



                           Scoliosis                 Mother  







   



                 Relation        Name            Status          Comments

 

   



                           Father                     

 

   



                                         Mother   







Social History





                                        Date



                 Tobacco Use     Types           Packs/Day       Years Used 

 

                                         



                                         Smoking Tobacco: Every    



                                         Day    

 

                                         



                                         Smokeless Tobacco: Never    







                                        Comments



                           Alcohol Use               Standard Drinks/Week 

 

                                         



                           Yes                       0 (1 standard drink = 0.6 o

z pure alcohol) 







 



                           Sex Assigned at Birth     Date Recorded

 

 



                                         Not on file 







Obstetrics History





Last Filed Vital Signs





                    Reading             Time Taken          Comments



                                         Vital Sign   

 

                    140/113             08/10/2021  1:18 PM CDT  



                                         Blood Pressure   

 

                    73                  08/10/2021 12:00 PM CDT  



                                         Pulse   

 

                    36.4 C (97.5 F) 08/10/2021 12:00 PM CDT  



                                         Temperature   

 

                    -                   -                    



                                         Respiratory Rate   

 

                    98%                 08/10/2021  1:18 PM CDT  



                                         Oxygen Saturation   

 

                    -                   -                    



                                         Inhaled Oxygen   



                                         Concentration   

 

                    80.7 kg (178 lb)    08/10/2021 12:00 PM CDT  



                                         Weight   

 

                    154.9 cm (5' 1")    08/10/2021 12:00 PM CDT  



                                         Height   

 

                    33.63               08/10/2021 12:00 PM CDT  



                                         Body Mass Index   







Plan of Treatment





   



                 Health Maintenance  Due Date        Last Done       Comments

 

   



                           MEDICARE ANNUAL WELLNESS  1950  



                                         VISIT   

 

   



                           HEPATITIS C SCREENING     1967  

 

   



                           PHYSICAL (COMPREHENSIVE)  1967  



                                         EXAM   

 

   



                           BREAST CANCER SCREENING   1989  

 

   



                           COLORECTAL CANCER         1994  



                                         SCREENING   

 

   



                           OSTEOPOROSIS              2014  



                                         SCREENING/MONITORING   

 

   



                     COVID-19 VACCINE (3 -  2021, 



                           Booster for Moderna       2021 



                                         series)   

 

   



                           ADVANCED CARE PLANNING    2022  



                                         DISCUSSION AND   



                                         DOCUMENTATION   

 

   



                           DEPRESSION SCREENING      2022  

 

   



                           INFLUENZA VACCINE         2022  

 

   



                     DTAP/TDAP VACCINES (2 -  2027 



                                         Tdap)   

 

   



                     PNEUMOCOCCAL VACCINE  Completed           2018, 



                                         2017 

 

   



                     SHINGLES RECOMBINANT  Completed           2019, 



                           VACCINE                   2010 







Medical Devices





                    Device Identifier   Shelf Expiration Date Model / Serial / L

ot



                 Implanted       Type            Area            Manufactur   



                                         er   

 

                                                             



                     Other-1985      Other               Right: Ankle    



                                         Implanted: 1985 (Quantity not    

  



                                         on file)      

 

 



                                         Description: Broken ankle one screw



                                         implanted







Results

Not on filefrom Last 3 Months



Insurance





                                        Type



            Payer      Benefit    Subscriber ID  Effective  Phone      Address 



                           Plan /                    Dates   



                                         Group     

 

                                        Medicare



            MEDICARE   MEDICARE   [id number]  2014-  [phone]  [address] 



                     [address]          Present             [address]                         [place] 



                                         [address] 







     



            Guarantor Name  Account    Relation to  Date of    Phone      Jimin

g Address



                     Type                Patient             Birth  

 

     



            Jessica Sutton  Personal/F  Self       1950  [phone]  [address]

[address] 

[address]



                     amily               (Home)              [place] [address]







Care Teams





                          Start Date                End Date



                     Team Member         Relationship        Specialty  

 

                          21                    



                           No Pcp, Na                PCP - General

## 2022-11-30 NOTE — DIAGNOSTIC IMAGING REPORT
INDICATION: Left knee pain.



EXAMINATION: AP and lateral views of the left knee were obtained.



Left knee prosthesis appears in good alignment. There is no sign

of fracture or device loosening. There is no unexpected

radiopaque foreign body post surgery.



IMPRESSION: Well aligned left knee prosthesis with no acute

abnormality. 



Dictated by: 



  Dictated on workstation # [id number]

## 2022-11-30 NOTE — CONSULTATION - HOSPITALIST
HPI


History of Present Illness:


HPI/Chief Complaint


CC: Medical management following knee replacement surgery





HPI: This is a 72 yr old female clinic pt of [facility] who presents to room 411 after 

uncomplicated knee replacement by Dr. George. Currently she is doing a lot 

better. Pain is fairly well controlled. She denies any nausea. I did restart all

of her home medication. Will monitor bp closely.


Source:  patient


Exam Limitations:  no limitations


Date Seen


11/30/22


Attending Physician


Dinesh Beck MD


PCP


Admitting Physician:


Rufus George MD 








Attending Physician:


Rufus George MD


Referring Physician





Date of Admission


Nov 30, 2022 at 06:00





Home Medications & Allergies


Home Medications


Reviewed patient Home Medication Reconciliation performed by pharmacy medication

reconciliations technician and/or nursing.


Patients Allergies have been reviewed.





Allergies





Allergies


Coded Allergies


  Penicillins (Unverified Allergy, Unknown, PASSED OUT, 5/6/17)








Past Medical-Social-Family Hx


Patient Social History


Marrital Status:  single


Employed/Student:  retired


Smoking Status:  Current Everyday Smoker





Immunizations Up To Date


First/Initial COVID19 Vaccinat:  2021


Second COVID19 Vaccination Erick:  2021


PED Vaccines UTD:  Yes





Seasonal Allergies


Seasonal Allergies:  Yes





Current Status


Primary Language:  English





Past Medical History


Surgeries:  Breast, Orthopedic


Currently Using CPAP:  No


Currently Using BIPAP:  No


High Cholesterol, Hypertension


Sexually Transmitted Disease:  No


HIV/AIDS:  No


Anxiety


Blood Disorders:  No





Family Medical History


No Pertinent Family Hx





Review of Systems


Constitutional:  see HPI


EENTM:  no symptoms reported


Respiratory:  no symptoms reported


Cardiovascular:  no symptoms reported


Gastrointestinal:  no symptoms reported


Genitourinary:  no symptoms reported


Musculoskeletal:  back pain, joint pain


Skin:  no symptoms reported


Psychiatric/Neurological:  No Symptoms Reported


All Other Systems Reviewed


Negative Unless Noted:  Yes





Physical Exam


Physical Exam


Vital Signs





Vital Signs - First Documented








 11/30/22





 07:11


 


Temp 36.7


 


Pulse 71


 


Resp 20


 


B/P (MAP) 122/97 (105)


 


Pulse Ox 94


 


O2 Delivery Room Air





Capillary Refill : Less Than 3 Seconds


Height, Weight, BMI


Height: 5'3.00"


Weight: 170lbs. oz. 77.888140rg; 34.96 BMI


Method:Stated


General Appearance:  No Apparent Distress, WD/WN


Eyes:  Bilateral Eye Normal Inspection, Bilateral Eye PERRL


HEENT:  PERRL/EOMI, Normal ENT Inspection, Pharynx Normal


Neck:  Full Range of Motion, Normal Inspection, Non Tender, Supple, Carotid 

Bruit


Respiratory:  Chest Non Tender, Lungs Clear, Normal Breath Sounds, No Accessory 

Muscle Use, No Respiratory Distress


Cardiovascular:  Regular Rate, Rhythm, No Edema, No Gallop, No JVD, No Murmur, 

Normal Peripheral Pulses


Gastrointestinal:  Normal Bowel Sounds, No Organomegaly, No Pulsatile Mass, Non 

Tender, Soft


Back:  Normal Inspection, No CVA Tenderness, No Vertebral Tenderness


Extremity:  Normal Capillary Refill, Normal Inspection, Normal Range of Motion 

(except left knee), Non Tender, No Calf Tenderness, No Pedal Edema


Neurologic/Psychiatric:  Alert, Oriented x3, No Motor/Sensory Deficits, Normal 

Mood/Affect


Skin:  Normal Color, Warm/Dry


Lymphatic:  No Adenopathy





Results


Results/Procedures


Labs


Patient resulted labs reviewed.





Assessment/Plan


Assessment and Plan


Assess & Plan/Chief Complaint


Assessment:


s/p left knee replacement


Smoker


HTN


HLP





Plan:


Pain control


PT OT





Diagnosis/Problems


Diagnosis/Problems





(1) Osteoarthritis of left knee











YANETH HUYNH DO                Nov 30, 2022 10:34

## 2022-11-30 NOTE — PROGRESS NOTE-POST OPERATIVE
Post-Operative Progess Note


Surgeon (s)/Assistant (s)


Surgeon


GUSTAVO WOODWARD MD


Assistant:  Antoine Bruce





Pre-Operative Diagnosis


left knee primary osteoarthritis





Post-Operative Diagnosis





left knee primary osteoarthritis





Procedure & Operative Findings


Date of Procedure


11/30/22


Procedure Performed/Findings


left total knee arthroplasty


Anesthesia Type


GETA





Estimated Blood Loss


Estimated blood loss (mL):  minimal





Specimens/Packing


Specimens Removed


none


Packing:  


none











GUSTAVO WOODWARD MD            Nov 30, 2022 07:06

## 2022-12-01 VITALS — DIASTOLIC BLOOD PRESSURE: 74 MMHG | SYSTOLIC BLOOD PRESSURE: 153 MMHG

## 2022-12-01 VITALS — SYSTOLIC BLOOD PRESSURE: 144 MMHG | DIASTOLIC BLOOD PRESSURE: 70 MMHG

## 2022-12-01 VITALS — SYSTOLIC BLOOD PRESSURE: 168 MMHG | DIASTOLIC BLOOD PRESSURE: 74 MMHG

## 2022-12-01 VITALS — SYSTOLIC BLOOD PRESSURE: 158 MMHG | DIASTOLIC BLOOD PRESSURE: 70 MMHG

## 2022-12-01 VITALS — SYSTOLIC BLOOD PRESSURE: 154 MMHG | DIASTOLIC BLOOD PRESSURE: 77 MMHG

## 2022-12-01 VITALS — DIASTOLIC BLOOD PRESSURE: 77 MMHG | SYSTOLIC BLOOD PRESSURE: 128 MMHG

## 2022-12-01 LAB
ALBUMIN SERPL-MCNC: 3.7 GM/DL (ref 3.2–4.5)
ALP SERPL-CCNC: 79 U/L (ref 40–136)
ALT SERPL-CCNC: 16 U/L (ref 0–55)
ARTERIAL PATENCY WRIST A: (no result)
BASE EXCESS STD BLDA CALC-SCNC: -0.1 MMOL/L (ref -2.5–2.5)
BASOPHILS # BLD AUTO: 0 10^3/UL (ref 0–0.1)
BASOPHILS NFR BLD AUTO: 0 % (ref 0–10)
BDY SITE: (no result)
BILIRUB SERPL-MCNC: 0.8 MG/DL (ref 0.1–1)
BODY TEMPERATURE: 37
BUN/CREAT SERPL: 18
CALCIUM SERPL-MCNC: 8.9 MG/DL (ref 8.5–10.1)
CHLORIDE SERPL-SCNC: 103 MMOL/L (ref 98–107)
CO2 BLDA CALC-SCNC: 25.5 MMOL/L (ref 21–31)
CO2 SERPL-SCNC: 20 MMOL/L (ref 21–32)
CREAT SERPL-MCNC: 0.66 MG/DL (ref 0.6–1.3)
EOSINOPHIL # BLD AUTO: 0 10^3/UL (ref 0–0.3)
EOSINOPHIL NFR BLD AUTO: 0 % (ref 0–10)
GFR SERPLBLD BASED ON 1.73 SQ M-ARVRAT: 93 ML/MIN
GLUCOSE SERPL-MCNC: 112 MG/DL (ref 70–105)
HCT VFR BLD CALC: 41 % (ref 35–52)
HGB BLD-MCNC: 13.6 G/DL (ref 11.5–16)
INHALED O2 FLOW RATE: (no result) L/MIN
LYMPHOCYTES # BLD AUTO: 1 10^3/UL (ref 1–4)
LYMPHOCYTES NFR BLD AUTO: 13 % (ref 12–44)
MANUAL DIFFERENTIAL PERFORMED BLD QL: NO
MCH RBC QN AUTO: 29 PG (ref 25–34)
MCHC RBC AUTO-ENTMCNC: 33 G/DL (ref 32–36)
MCV RBC AUTO: 86 FL (ref 80–99)
MONOCYTES # BLD AUTO: 0.8 10^3/UL (ref 0–1)
MONOCYTES NFR BLD AUTO: 10 % (ref 0–12)
NEUTROPHILS # BLD AUTO: 6.1 10^3/UL (ref 1.8–7.8)
NEUTROPHILS NFR BLD AUTO: 76 % (ref 42–75)
PCO2 BLDA: 41 MMHG (ref 35–45)
PH BLDA: 7.39 [PH] (ref 7.37–7.43)
PLATELET # BLD: 181 10^3/UL (ref 130–400)
PMV BLD AUTO: 9.6 FL (ref 9–12.2)
PO2 BLDA: 61 MMHG (ref 79–93)
POTASSIUM SERPL-SCNC: 3.6 MMOL/L (ref 3.6–5)
PROT SERPL-MCNC: 6.6 GM/DL (ref 6.4–8.2)
SAO2 % BLDA FROM PO2: 91 % (ref 94–100)
SODIUM SERPL-SCNC: 137 MMOL/L (ref 135–145)
VENTILATION MODE VENT: NO
WBC # BLD AUTO: 7.9 10^3/UL (ref 4.3–11)

## 2022-12-01 RX ADMIN — ALBUTEROL SULFATE SCH MG: 2.5 SOLUTION RESPIRATORY (INHALATION) at 14:36

## 2022-12-01 RX ADMIN — SODIUM CHLORIDE SCH MLS/HR: 900 INJECTION, SOLUTION INTRAVENOUS at 12:27

## 2022-12-01 RX ADMIN — ASPIRIN SCH MG: 81 TABLET ORAL at 09:59

## 2022-12-01 RX ADMIN — DOCUSATE SODIUM AND SENNOSIDES SCH EA: 8.6; 5 TABLET, FILM COATED ORAL at 09:59

## 2022-12-01 RX ADMIN — ENOXAPARIN SODIUM SCH MG: 30 INJECTION SUBCUTANEOUS at 18:36

## 2022-12-01 RX ADMIN — GUAIFENESIN SCH MG: 600 TABLET, EXTENDED RELEASE ORAL at 09:59

## 2022-12-01 RX ADMIN — ENOXAPARIN SODIUM SCH MG: 30 INJECTION SUBCUTANEOUS at 06:03

## 2022-12-01 RX ADMIN — DOCUSATE SODIUM AND SENNOSIDES SCH EA: 8.6; 5 TABLET, FILM COATED ORAL at 21:00

## 2022-12-01 RX ADMIN — LORATADINE SCH MG: 10 TABLET ORAL at 09:59

## 2022-12-01 NOTE — ANESTHESIA-GENERAL POST-OP
General


Patient Condition


Mental Status/LOC:  Same as Preop


Cardiovascular:  Satisfactory


Nausea/Vomiting:  Absent


Respiratory:  Satisfactory


Pain:  Controlled


Complications:  Absent





Post Op Complications


Complications


None





Follow Up Care/Instructions


Patient Instructions


None needed.





Anesthesia/Patient Condition


Patient Condition


Patient is doing well, no complaints, stable vital signs, no apparent adverse 

anesthesia problems.   


No complications reported per nursing.











YASEMIN LAWRENCE CRNA          Dec 1, 2022 09:53

## 2022-12-01 NOTE — PROGRESS NOTE
Standard Progress Note


Progress Notes/Assess & Plan


Date Seen by a Provider:  Dec 1, 2022


Time Seen by a Provider:  07:54


Progress/Assessment & Plan


post op check


no complaints


radographs--Hw well positioned without fracture


LLE--2 plus DP pulse with brisk refill


sensation intact to light touch throughout


intact DF and PF of toes and ankle


s/p LTKA 


mobilize as able


Final Diagnosis


confused to time today





Laboratory Tests








Test


 12/1/22


05:22 Range/Units


 


 


White Blood Count


 7.9 


 4.3-11.0


10^3/uL


 


Red Blood Count


 4.77 


 3.80-5.11


10^6/uL


 


Hemoglobin 13.6  11.5-16.0  g/dL


 


Hematocrit 41  35-52  %


 


Mean Corpuscular Volume 86  80-99  fL


 


Mean Corpuscular Hemoglobin 29  25-34  pg


 


Mean Corpuscular Hemoglobin


Concent 33 


 32-36  g/dL





 


Red Cell Distribution Width 14.7 H 10.0-14.5  %


 


Platelet Count


 181 


 130-400


10^3/uL


 


Mean Platelet Volume 9.6  9.0-12.2  fL


 


Immature Granulocyte % (Auto) 0   %


 


Neutrophils (%) (Auto) 76 H 42-75  %


 


Lymphocytes (%) (Auto) 13  12-44  %


 


Monocytes (%) (Auto) 10  0-12  %


 


Eosinophils (%) (Auto) 0  0-10  %


 


Basophils (%) (Auto) 0  0-10  %


 


Neutrophils # (Auto)


 6.1 


 1.8-7.8


10^3/uL


 


Lymphocytes # (Auto)


 1.0 


 1.0-4.0


10^3/uL


 


Monocytes # (Auto)


 0.8 


 0.0-1.0


10^3/uL


 


Eosinophils # (Auto)


 0.0 


 0.0-0.3


10^3/uL


 


Basophils # (Auto)


 0.0 


 0.0-0.1


10^3/uL


 


Immature Granulocyte # (Auto)


 0.0 


 0.0-0.1


10^3/uL


 


Sodium Level 137  135-145  MMOL/L


 


Potassium Level 3.6  3.6-5.0  MMOL/L


 


Chloride Level 103    MMOL/L


 


Carbon Dioxide Level 20 L 21-32  MMOL/L


 


Anion Gap 14  5-14  MMOL/L


 


Blood Urea Nitrogen 12  7-18  MG/DL


 


Creatinine


 0.66 


 0.60-1.30


MG/DL


 


Estimat Glomerular Filtration


Rate 93 


  





 


BUN/Creatinine Ratio 18   


 


Glucose Level 112 H   MG/DL


 


Calcium Level 8.9  8.5-10.1  MG/DL


 


Corrected Calcium 9.1  8.5-10.1  MG/DL


 


Total Bilirubin 0.8  0.1-1.0  MG/DL


 


Aspartate Amino Transf


(AST/SGOT) 21 


 5-34  U/L





 


Alanine Aminotransferase


(ALT/SGPT) 16 


 0-55  U/L





 


Alkaline Phosphatase 79    U/L


 


Total Protein 6.6  6.4-8.2  GM/DL


 


Albumin 3.7  3.2-4.5  GM/DL








Vital Signs








  Date Time  Temp Pulse Resp B/P (MAP) Pulse Ox O2 Delivery O2 Flow Rate FiO2


 


12/1/22 07:37 36.2 95 20 154/77 (102) 92 Nasal Cannula 2.00 


 


12/1/22 06:55     90 Nasal Cannula 2.00 


 


12/1/22 04:52 37.6 97 18 158/70 (99) 94 Nasal Cannula 2.00 


 


12/1/22 00:28 37.0 84 18 168/74 (105) 93 Nasal Cannula 2.00 


 


11/30/22 23:36     92 Room Air  


 


11/30/22 20:30      Nasal Cannula 2.00 


 


11/30/22 19:45 37.6 76 18 121/65 (83) 95 Nasal Cannula 2.00 


 


11/30/22 15:50 36.3 64 20 125/58 (80) 93 Room Air  


 


11/30/22 15:50 36.3 64 20 125/58 (80) 93 Room Air  


 


11/30/22 11:52 36.6 79 16 107/65 (79) 93 Room Air  


 


11/30/22 11:17 36.6 79 16 107/65 (79) 93 Room Air  


 


11/30/22 10:34 36.3 76 16 109/51 (70) 92 Nasal Cannula 0.50 


 


11/30/22 09:55      Nasal Cannula 2.00 


 


11/30/22 09:50 36.1  16 121/51 (74) 96 Nasal Cannula 2.00 


 


11/30/22 09:45      Nasal Cannula 2.00 


 


11/30/22 09:40   18 121/65 (83) 97 Nasal Cannula 2.00 


 


11/30/22 09:30      OxyMask 2.00 


 


11/30/22 09:30   20 112/54 (73) 97 OxyMask 2.00 


 


11/30/22 09:20   22 125/62 (83) 97 OxyMask 4.00 


 


11/30/22 09:15      OxyMask 4.00 


 


11/30/22 09:10   24 132/79 (96) 96 OxyMask 4.00 


 


11/30/22 08:59 36.1  22 132/69 (90) 95 OxyMask 6.00 


 


11/30/22 08:59      OxyMask 6.00 














I & O 


 


 12/1/22





 07:00


 


Intake Total 3530 ml


 


Output Total 100 ml


 


Balance 3430 ml





LLE--dressing intact. NVI


no calf tenderness


s/p LTKA


mobilize as able











GUSTAVO WOODWARD MD             Dec 1, 2022 07:54

## 2022-12-01 NOTE — PHYSICAL THERAPY DAILY NOTE
PT Daily Note-Current


Subjective


Patient remains highly confused.





Pain


Section J - Health Conditions


1. Rarely or not at all


2. Occasionally


3. Frequently


4. Almost constantly


8. Unable to answer


Pain Effect on Sleep:  8


Pain Interference with Therapy:  8


Pain Interference w/Day-to-Day:  8





Mental Status


Patient Orientation:  Confused


Attachments:  IV





Transfers


SCALE: Activities may be completed with or without assistive devices.





6-Indepedent-patient completes the activity by him/herself with no assistance 

from a helper.


5-Set-up or Clean-up Assistance-helper sets up or cleans up; patient completes 

activity. [place] assists only prior to or  


    following the activity.


4-Supervision or Touching Assistance-helper provides verbal cues and/or 

touching/steadying and/or contact guard assistance as patient completes 

activity. Assistance may be provided   


    throughout the activity or intermittently.


3-Partial/Moderate Assistance-helper does LESS THAN HALF the effort. [place] 

lifts, holds or supports trunk or limbs, but provides less than half the effort.


2-Substantial/Maximal Assistance-helper does MORE THAN HALF the effort. [place] 

lifts or holds trunk or limbs and provides more than half the effort.


7-Qcdbosunn-amwlhu does ALL the effort. Patient does none of the effort to 

complete the activity. Or, the assistance of 2 or more helpers is required for 

the patient to complete the  


    activity.


If activity was not attempted, code reason:


7-Patient Refused.


9-Not Applicable-not attempted and the patient did not perform the activity 

before the current illness, exacerbation or injury.


10-Not Attempted due to Environmental Limitations-(lack of equipment, weather 

restraints, etc.).


88-Not Attempted due to Medical Conditions or Safety Concerns.


Sit to Stand (QC):  3





Weight Bearing


Right Lower Extremity:  Right


Full Weight Bearing


Left Lower Extremity:  Left


Weight Bearing/Tolerated





Gait Training


Distance:  100'


Walk 10 feet (QC):  3


Walk 50 ft with 2 Turns(QC):  3


Gait Assistive Device:  FWW


VC's for body placement in FWW and step length/extended UE's with FWW use and 

step to gait sequence





Exercises


Seated Therapy Exercises:  Ankle pumps, Long arc quads, Hip flexion


Seated Reps:  15 (AAROM left LE)





Assessment


Patient continues to require time to complete all functional tasks and 

redirection to remain on task.  Patient will require continued skilled PT from 

acute floor due to patient is currently not safe to return to home at this time.

 SW notified.





PT Long Term Goals


Long Term Goals


PT Long Term Goals Time Frame:  Dec 7, 2022


Roll Left & Right (QC):  6


Sit to Lying (QC):  6


Lying-Sitting on Side/Bed(QC):  6


Sit to Stand (QC):  6


Chair/Bed-to-Chair Xfer(QC):  6


Toilet Transfer (QC):  6


Does the Patient Walk:  Yes


Walk 10 feet (QC):  6


Walk 50ft with 2 Turns (QC):  6


Walk 150 ft (QC):  6





PT Plan


Treatment/Plan


Treatment Plan:  Continue Plan of Care


Treatment Plan:  Bed Mobility, Education, Functional Activity Andres, Functional 

Strength, Gait, Safety, Therapeutic Exercise, Transfers


Treatment Duration:  Dec 7, 2022


Frequency:  11 times per week


Estimated Hrs Per Day:  .25 hour per day


Patient and/or Family Agrees t:  Yes





Time


Time In:  1245


Time Out:  1308


DATE:  Dec 1, 2022


Total Billed Treatment Time:  23


Total Billed Treatment


1 visit


EX 10 min


GT 13 min











LYNDA MCKEON PT               Dec 1, 2022 13:58

## 2022-12-01 NOTE — PROGRESS NOTE - HOSPITALIST
Subjective


HPI/CC On Admission


Date Seen by Provider:  Dec 1, 2022


Time Seen by Provider:  11:00


CC: Medical management following knee replacement surgery





HPI: This is a 72 yr old female clinic pt of [facility] who presents to room 411 after 

uncomplicated knee replacement by Dr. George. Currently she is doing a lot 

better. Pain is fairly well controlled. She denies any nausea. I did restart all

of her home medication. Will monitor bp closely.


Subjective/Events-last exam


Patient confused


Stopping PCA


Chest x-ray shows atelectasis


Hypoxia noted


ABG revealed hypoxemia


Slow recovery may need inpatient rehab





Review of Systems


Musculoskeletal:  leg pain


Neurological:  Confusion





Objective


Exam


Vital Signs





Vital Signs








  Date Time  Temp Pulse Resp B/P (MAP) Pulse Ox O2 Delivery O2 Flow Rate FiO2


 


12/2/22 04:00 37.5 79 20 128/62 (84) 94 Nasal Cannula 2.00 





Capillary Refill : Less Than 3 Seconds


General Appearance:  No Apparent Distress, WD/WN, Chronically ill


Respiratory:  No Accessory Muscle Use, No Respiratory Distress, Crackles, 

Decreased Breath Sounds


Cardiovascular:  Regular Rate, Rhythm


Neurologic/Psychiatric:  Alert, Disoriented





Results/Procedures


Lab


Laboratory Tests


12/1/22 05:22








Patient resulted labs reviewed.





Assessment/Plan


Assessment and Plan


Assess & Plan/Chief Complaint


Assessment:


s/p left knee replacement


Smoker


HTN


HLP


Postop hypoxia with atelectasis on chest x-ray





Plan:


Pain control


PT OT


DC PCA


Incentive spirometer


Meeting inpatient rehab criteria





Diagnosis/Problems


Diagnosis/Problems





(1) Osteoarthritis of left knee











YANETH HUYNH DO                 Dec 1, 2022 09:39

## 2022-12-01 NOTE — DIAGNOSTIC IMAGING REPORT
CHEST 1 VIEW, AP/PA ONLY



INDICATION: Hypoxia.



COMPARISON: 11/22/2022.



FINDINGS:

Increasing bibasilar linear opacities. No pleural effusion or

pneumothorax. Normal cardiomediastinal silhouette.



IMPRESSION: 

1. Worsening of bibasilar pulmonary opacities that are most

likely due to atelectasis.



Dictated by: 



  Dictated on workstation # [id number]

## 2022-12-01 NOTE — OCCUPATIONAL THERAPY EVAL
OT Evaluation-General/PLF


Medical Diagnosis


Admission Date


Nov 30, 2022 at 06:00


Medical Diagnosis:  Left TKA


Onset Date:  Nov 30, 2022





Therapy Diagnosis


Therapy Diagnosis:  decreased ADL status





Height/Weight


Height (Feet):  5


Height (Inches):  3.00


Weight (Pounds):  170





Precautions


Precautions/Isolations:  Fall Prevention, Standard Precautions





Referral


Physician:  Teo


Referral Reason:  Evaluation/Treatment





Medical History


Additional Medical History


inflammatory arthritis, b/l total hip arthroplasties, tonsillectomy, ankle sx


Current History


s/p L TKA 11/30/22





Social History


Home:  Single Level


Current Living Status:  Alone


Entry Into Home:  Level Entry





ADL-Prior Level of Function


SCALE: Activities may be completed with or without assistive devices.





6-Indepedent-patient completes the activity by him/herself with no assistance 

from a helper.


5-Set-up or Clean-up Assistance-helper sets up or cleans up; patient completes 

activity. [place] assists only prior to or  


    following the activity.


4-Supervision or Touching Assistance-helper provides verbal cues and/or 

touching/steadying and/or contact guard assistance as patient completes 

activity. Assistance may be provided   


    throughout the activity or intermittently.


3-Partial/Moderate Assistance-helper does LESS THAN HALF the effort. [place] 

lifts, holds or supports trunk or limbs, but provides less than half the effort.


2-Substantial/Maximal Assistance-helper does MORE THAN HALF the effort. [place] 

lifts or holds trunk or limbs and provides more than half the effort.


3-Adfzxyvda-sinxfo does ALL the effort. Patient does none of the effort to 

complete the activity. Or, the assistance of 2 or more helpers is required for 

the patient to complete the  


    activity.


If activity was not attempted, code reason:


7-Patient Refused.


9-Not Applicable-not attempted and the patient did not perform the activity 

before the current illness, exacerbation or injury.


10-Not Attempted due to Environmental Limitations-(lack of equipment, weather 

restraints, etc.).


88-Not Attempted due to Medical Conditions or Safety Concerns.


ADL PLOF Comments


Pt reports IND with ADLs and functional mobility at PLOF, using a walker due to 

knee pain.


Self Care:  Independent


Functional Cognition:  Independent


DME/Equipment:  Bath Bench, Tub/Shower





OT Current Status


Subjective


Pt up in recliner upon OT arrival. O2 saturation 89% on RA. OT placed NC back on

pt from her lap and O2 saturation returned to the 90%'s.


Pt oriented to place and situation, but when asked various questions, pt would 

reply without answering the question fully. Pt had difficulty keeping eyes open 

throughout tx.





Pain





   Numeric Pain Scale:  7


   Location:  Left


   Location Body Site:  Knee





Mental Status/Objective


Patient Orientation:  Person, Confused, Place, Situation


Attachments:  IV





Current


Upper Extremity ROM


WFL, BUE shoulder flexion to approx 150 degrees


Upper Extremity Coordination


WFL


Upper Extremity Sensation


WFL


Upper Extremity Strength


grossly 3/5





ADL-Treatment


Eating (QC):  6 (IND per pt report.)


Shower/Bathe Self (QC):  88


On/Off Footwear (QC):  1 (Pt unable to reach feet to complete footwear.)





Other Treatments


Pt in recliner, agreeable to OT tx. Pt provided information about PLOF and home 

set up to her ability. Pt often would attempt to answer OT questions, but never 

fully answered the question presented. She had difficulty keeping eyes open 

throughout evaluation/tx. Pt participated in UE screen. Pt attempted to complete

footwear, but unable to reach forward far enough to reach either foot. Post tx, 

pt in recliner, call light in reach and all needs met.





Education


OT Patient Education:  Correct positioning, Energy conservation, Exercise 

program, Modified ADL techniques, Progress toward Goal/Update tx plan, Purpose 

of tx/functional activities, Rehab process


Teaching Recipient:  Patient


Teaching Methods:  Discussion


Response to Teaching:  Verbalize Understanding





OT Long Term Goals


Long Term Goals


Time Frame:  Dec 16, 2022


Eating (QC):  6


Oral Hygiene (QC):  6


Toileting Hygiene (QC):  6


Shower/Bathe Self (QC):  6


Upper Body Dressing (QC):  6


Lower Body Dressing (QC):  6


On/Off Footwear (QC):  6


Additional Goals:  1-Demonstrate ADL Tasks, 2-Verbalize Understanding, 3-

ImproveStrength/Andres


1=Demonstrate adherence to instructed precautions during ADL tasks.


2=Patient will verbalize/demonstrate understanding of assistive 

devices/modifications for ADL.


3=Patient will improve strength/tolerance for activity to enable patient to 

perform ADL's.





OT Education/Plan


Problem List/Assessment


Assessment:  Decreased Activ Tolerance, Decreased Safety Aware, Decreased UE 

Strength, Impaired Cognition, Impaired Funct Balance, Impaired I ADL's, Impaired

Self-Care Skills, Restricted Funct UE ROM





Discharge Recommendations


Plan/Recommendations:  Continue POC





Treatment Plan/Plan of Care


Patient would benefit from OT for education, treatment and training to promote 

independence in ADL's, mobility, safety and/or upper extremity function for 

ADL's.


Plan of Care:  ADL Retraining, Functional Mobility, UE Funct Exercise/Act


Treatment Duration:  Dec 16, 2022


Frequency:  3 times per week (3-5 times per week)


Rehab Potential:  Fair





Time


Start Time:  09:18


Stop Time:  09:32


DATE:  Dec 1, 2022


Total Time Billed (hr/min):  14


Billed Treatment Time


1, SANTY WATSON OT           Dec 1, 2022 09:55 Pt came c/o vomiting x 6 hours

## 2022-12-01 NOTE — PHYSICAL THERAPY DAILY NOTE
PT Daily Note-Current


Subjective


Patient is very confused and slightly agitated.





Pain


Section J - Health Conditions


1. Rarely or not at all


2. Occasionally


3. Frequently


4. Almost constantly


8. Unable to answer


Pain Effect on Sleep:  8


Pain Interference with Therapy:  8


Pain Interference w/Day-to-Day:  8





Mental Status


Patient Orientation:  Confused


Attachments:  Oxygen, IV





Transfers


SCALE: Activities may be completed with or without assistive devices.





6-Indepedent-patient completes the activity by him/herself with no assistance 

from a helper.


5-Set-up or Clean-up Assistance-helper sets up or cleans up; patient completes 

activity. [place] assists only prior to or  


    following the activity.


4-Supervision or Touching Assistance-helper provides verbal cues and/or 

touching/steadying and/or contact guard assistance as patient completes 

activity. Assistance may be provided   


    throughout the activity or intermittently.


3-Partial/Moderate Assistance-helper does LESS THAN HALF the effort. [place] 

lifts, holds or supports trunk or limbs, but provides less than half the effort.


2-Substantial/Maximal Assistance-helper does MORE THAN HALF the effort. [place] 

lifts or holds trunk or limbs and provides more than half the effort.


2-Kzbmolyrr-rywxok does ALL the effort. Patient does none of the effort to 

complete the activity. Or, the assistance of 2 or more helpers is required for 

the patient to complete the  


    activity.


If activity was not attempted, code reason:


7-Patient Refused.


9-Not Applicable-not attempted and the patient did not perform the activity 

before the current illness, exacerbation or injury.


10-Not Attempted due to Environmental Limitations-(lack of equipment, weather 

restraints, etc.).


88-Not Attempted due to Medical Conditions or Safety Concerns.


Lying to Sitting/Side of Bed(Q:  3


Sit to Stand (QC):  3


Chair/Bed-to-Chair Xfer(QC):  3


Toilet Transfer (QC):  3





Weight Bearing


Right Lower Extremity:  Right


Full Weight Bearing


Left Lower Extremity:  Left


Weight Bearing/Tolerated





Gait Training


Distance:  125'


Walk 10 feet (QC):  3


Walk 50 ft with 2 Turns(QC):  3


Walk 150 ft (QC):  3


Gait Assistive Device:  FWW


flexed trunk posture with gait training/step to gait sequence





Exercises


Supine Ex:  Ankle pumps, Quad Set, Heel Slides, Straight leg raise


Supine Reps:  12 (AAROM)


Seated Therapy Exercises:  Long arc quads


Seated Reps:  12





Assessment


Patient remains very confused requiring redirection to remain on task.  PT to 

increase activity as tolerated by patient.





PT Long Term Goals


Long Term Goals


PT Long Term Goals Time Frame:  Dec 7, 2022


Roll Left & Right (QC):  6


Sit to Lying (QC):  6


Lying-Sitting on Side/Bed(QC):  6


Sit to Stand (QC):  6


Chair/Bed-to-Chair Xfer(QC):  6


Toilet Transfer (QC):  6


Does the Patient Walk:  Yes


Walk 10 feet (QC):  6


Walk 50ft with 2 Turns (QC):  6


Walk 150 ft (QC):  6





PT Plan


Treatment/Plan


Treatment Plan:  Continue Plan of Care


Treatment Plan:  Bed Mobility, Education, Functional Activity Andres, Functional 

Strength, Gait, Safety, Therapeutic Exercise, Transfers


Treatment Duration:  Dec 7, 2022


Frequency:  11 times per week


Estimated Hrs Per Day:  .25 hour per day


Patient and/or Family Agrees t:  Yes





Time


Time In:  800


Time Out:  825


DATE:  Dec 1, 2022


Total Billed Treatment Time:  25


Total Billed Treatment


1 visit


EX 13 min


GT 12 min











LYNDA MCKEON PT               Dec 1, 2022 10:33

## 2022-12-02 VITALS — DIASTOLIC BLOOD PRESSURE: 69 MMHG | SYSTOLIC BLOOD PRESSURE: 119 MMHG

## 2022-12-02 VITALS — SYSTOLIC BLOOD PRESSURE: 128 MMHG | DIASTOLIC BLOOD PRESSURE: 62 MMHG

## 2022-12-02 VITALS — SYSTOLIC BLOOD PRESSURE: 119 MMHG | DIASTOLIC BLOOD PRESSURE: 69 MMHG

## 2022-12-02 VITALS — DIASTOLIC BLOOD PRESSURE: 45 MMHG | SYSTOLIC BLOOD PRESSURE: 117 MMHG

## 2022-12-02 LAB
ALBUMIN SERPL-MCNC: 3.2 GM/DL (ref 3.2–4.5)
ALP SERPL-CCNC: 73 U/L (ref 40–136)
ALT SERPL-CCNC: 11 U/L (ref 0–55)
BASOPHILS # BLD AUTO: 0 10^3/UL (ref 0–0.1)
BASOPHILS NFR BLD AUTO: 0 % (ref 0–10)
BILIRUB SERPL-MCNC: 0.8 MG/DL (ref 0.1–1)
BUN/CREAT SERPL: 27
CALCIUM SERPL-MCNC: 8.9 MG/DL (ref 8.5–10.1)
CHLORIDE SERPL-SCNC: 107 MMOL/L (ref 98–107)
CO2 SERPL-SCNC: 21 MMOL/L (ref 21–32)
CREAT SERPL-MCNC: 0.52 MG/DL (ref 0.6–1.3)
EOSINOPHIL # BLD AUTO: 0 10^3/UL (ref 0–0.3)
EOSINOPHIL NFR BLD AUTO: 0 % (ref 0–10)
GFR SERPLBLD BASED ON 1.73 SQ M-ARVRAT: 98 ML/MIN
GLUCOSE SERPL-MCNC: 127 MG/DL (ref 70–105)
HCT VFR BLD CALC: 38 % (ref 35–52)
HGB BLD-MCNC: 12.7 G/DL (ref 11.5–16)
LYMPHOCYTES # BLD AUTO: 1 10^3/UL (ref 1–4)
LYMPHOCYTES NFR BLD AUTO: 14 % (ref 12–44)
MANUAL DIFFERENTIAL PERFORMED BLD QL: NO
MCH RBC QN AUTO: 29 PG (ref 25–34)
MCHC RBC AUTO-ENTMCNC: 33 G/DL (ref 32–36)
MCV RBC AUTO: 88 FL (ref 80–99)
MONOCYTES # BLD AUTO: 0.8 10^3/UL (ref 0–1)
MONOCYTES NFR BLD AUTO: 12 % (ref 0–12)
NEUTROPHILS # BLD AUTO: 5.3 10^3/UL (ref 1.8–7.8)
NEUTROPHILS NFR BLD AUTO: 74 % (ref 42–75)
PLATELET # BLD: 162 10^3/UL (ref 130–400)
PMV BLD AUTO: 10.2 FL (ref 9–12.2)
POTASSIUM SERPL-SCNC: 3.3 MMOL/L (ref 3.6–5)
PROT SERPL-MCNC: 6 GM/DL (ref 6.4–8.2)
SODIUM SERPL-SCNC: 139 MMOL/L (ref 135–145)
WBC # BLD AUTO: 7.2 10^3/UL (ref 4.3–11)

## 2022-12-02 RX ADMIN — ALBUTEROL SULFATE SCH MG: 2.5 SOLUTION RESPIRATORY (INHALATION) at 08:29

## 2022-12-02 RX ADMIN — GUAIFENESIN SCH MG: 600 TABLET, EXTENDED RELEASE ORAL at 09:16

## 2022-12-02 RX ADMIN — OXYCODONE HYDROCHLORIDE AND ACETAMINOPHEN PRN TAB: 5; 325 TABLET ORAL at 03:53

## 2022-12-02 RX ADMIN — LORATADINE SCH MG: 10 TABLET ORAL at 09:16

## 2022-12-02 RX ADMIN — DOCUSATE SODIUM AND SENNOSIDES SCH EA: 8.6; 5 TABLET, FILM COATED ORAL at 09:16

## 2022-12-02 RX ADMIN — OXYCODONE HYDROCHLORIDE AND ACETAMINOPHEN PRN TAB: 5; 325 TABLET ORAL at 09:19

## 2022-12-02 RX ADMIN — ENOXAPARIN SODIUM SCH MG: 30 INJECTION SUBCUTANEOUS at 06:36

## 2022-12-02 RX ADMIN — ASPIRIN SCH MG: 81 TABLET ORAL at 09:16

## 2022-12-02 NOTE — PROGRESS NOTE
Standard Progress Note


Progress Notes/Assess & Plan


Date Seen by a Provider:  Dec 2, 2022


Time Seen by a Provider:  07:09


Progress/Assessment & Plan


post op check


no complaints


radographs--Hw well positioned without fracture


LLE--2 plus DP pulse with brisk refill


sensation intact to light touch throughout


intact DF and PF of toes and ankle


s/p LTKA 


mobilize as able


Final Diagnosis


doing much better today





Vital Signs








  Date Time  Temp Pulse Resp B/P (MAP) Pulse Ox O2 Delivery O2 Flow Rate FiO2


 


12/2/22 04:00 37.5 79 20 128/62 (84) 94 Nasal Cannula 2.00 


 


12/2/22 00:09 37.1 94 20 117/45 (69) 95 Nasal Cannula 2.00 


 


12/1/22 20:30      Nasal Cannula 2.00 


 


12/1/22 19:17 37.4 98 18 153/74 (100) 94 Nasal Cannula 2.00 


 


12/1/22 15:40 37.0 98 20 128/77 (94) 92  2.00 


 


12/1/22 14:37     92 Nasal Cannula 2.00 


 


12/1/22 13:20     92 Nasal Cannula 2.00 


 


12/1/22 12:03 36.3 96 20 144/70 (94) 93 Nasal Cannula 2.00 


 


12/1/22 10:33     90 Nasal Cannula 2.00 


 


12/1/22 08:00      Nasal Cannula 2.00 


 


12/1/22 07:37 36.2 95 20 154/77 (102) 92 Nasal Cannula 2.00 














I & O 


 


 12/2/22





 07:00


 


Intake Total 3380 ml


 


Output Total 1300 ml


 


Balance 2080 ml








Laboratory Tests








Test


 12/1/22


13:31 12/2/22


05:18 Range/Units


 


 


Blood Gas Puncture Site R RAD    


 


Blood Gas Patient Temperature 37    


 


Arterial Blood pH 7.39   7.37-7.43  


 


Arterial Blood Partial


Pressure CO2 41 


 


 35-45  MMHG





 


Arterial Blood Partial


Pressure O2 61 L


 


 79-93  MMHG





 


Arterial Blood HCO3 24   23-27  MMOL/L


 


Arterial Blood Total CO2


 25.5 


 


 21.0-31.0


MMOL/L


 


Arterial Blood Oxygen


Saturation 91 L


 


   %





 


Arterial Blood Base Excess


 -0.1 


 


 -2.5-2.5


MMOL/L


 


[place]mer Test YES-POS    


 


Blood Gas Ventilator Setting NO    


 


Blood Gas Inspired Oxygen 2 L    


 


White Blood Count


 


 7.2 


 4.3-11.0


10^3/uL


 


Red Blood Count


 


 4.37 


 3.80-5.11


10^6/uL


 


Hemoglobin  12.7  11.5-16.0  g/dL


 


Hematocrit  38  35-52  %


 


Mean Corpuscular Volume  88  80-99  fL


 


Mean Corpuscular Hemoglobin  29  25-34  pg


 


Mean Corpuscular Hemoglobin


Concent 


 33 


 32-36  g/dL





 


Red Cell Distribution Width  14.8 H 10.0-14.5  %


 


Platelet Count


 


 162 


 130-400


10^3/uL


 


Mean Platelet Volume  10.2  9.0-12.2  fL


 


Immature Granulocyte % (Auto)  0   %


 


Neutrophils (%) (Auto)  74  42-75  %


 


Lymphocytes (%) (Auto)  14  12-44  %


 


Monocytes (%) (Auto)  12  0-12  %


 


Eosinophils (%) (Auto)  0  0-10  %


 


Basophils (%) (Auto)  0  0-10  %


 


Neutrophils # (Auto)


 


 5.3 


 1.8-7.8


10^3/uL


 


Lymphocytes # (Auto)


 


 1.0 


 1.0-4.0


10^3/uL


 


Monocytes # (Auto)


 


 0.8 


 0.0-1.0


10^3/uL


 


Eosinophils # (Auto)


 


 0.0 


 0.0-0.3


10^3/uL


 


Basophils # (Auto)


 


 0.0 


 0.0-0.1


10^3/uL


 


Immature Granulocyte # (Auto)


 


 0.0 


 0.0-0.1


10^3/uL


 


Sodium Level  139  135-145  MMOL/L


 


Potassium Level  3.3 L 3.6-5.0  MMOL/L


 


Chloride Level  107    MMOL/L


 


Carbon Dioxide Level  21  21-32  MMOL/L


 


Anion Gap  11  5-14  MMOL/L


 


Blood Urea Nitrogen  14  7-18  MG/DL


 


Creatinine


 


 0.52 L


 0.60-1.30


MG/DL


 


Estimat Glomerular Filtration


Rate 


 98 


  





 


BUN/Creatinine Ratio  27   


 


Glucose Level  127 H   MG/DL


 


Calcium Level  8.9  8.5-10.1  MG/DL


 


Corrected Calcium  9.5  8.5-10.1  MG/DL


 


Total Bilirubin  0.8  0.1-1.0  MG/DL


 


Aspartate Amino Transf


(AST/SGOT) 


 21 


 5-34  U/L





 


Alanine Aminotransferase


(ALT/SGPT) 


 11 


 0-55  U/L





 


Alkaline Phosphatase  73    U/L


 


Total Protein  6.0 L 6.4-8.2  GM/DL


 


Albumin  3.2  3.2-4.5  GM/DL





LLE--no calf tenderness. neg Judson's


incision clean and dry


s/p LTKA  back to baseline mentation


if clears PT today may DC home, if not, DC to [facility]











GUSTAVO WOODWARD MD             Dec 2, 2022 07:12

## 2022-12-02 NOTE — PHYSICAL THERAPY DAILY NOTE
PT Daily Note-Current


Subjective


Patient alert and oriented x 4.  Reports she wants to go home with home health. 

Agrees to PT.





Pain





   Numeric Pain Scale:  5-Moderate Pain


   Location:  Left


   Location Body Site:  Knee


   Pain Description:  Acute


Section J - Health Conditions


1. Rarely or not at all


2. Occasionally


3. Frequently


4. Almost constantly


8. Unable to answer


Pain Effect on Sleep:  2


Pain Interference with Therapy:  2


Pain Interference w/Day-to-Day:  2





Mental Status


Patient Orientation:  Normal For Age





Transfers


SCALE: Activities may be completed with or without assistive devices.





6-Indepedent-patient completes the activity by him/herself with no assistance 

from a helper.


5-Set-up or Clean-up Assistance-helper sets up or cleans up; patient completes 

activity. [place] assists only prior to or  


    following the activity.


4-Supervision or Touching Assistance-helper provides verbal cues and/or 

touching/steadying and/or contact guard assistance as patient completes 

activity. Assistance may be provided   


    throughout the activity or intermittently.


3-Partial/Moderate Assistance-helper does LESS THAN HALF the effort. [place] 

lifts, holds or supports trunk or limbs, but provides less than half the effort.


2-Substantial/Maximal Assistance-helper does MORE THAN HALF the effort. [place] 

lifts or holds trunk or limbs and provides more than half the effort.


4-Temdoawdg-kjbnph does ALL the effort. Patient does none of the effort to 

complete the activity. Or, the assistance of 2 or more helpers is required for 

the patient to complete the  


    activity.


If activity was not attempted, code reason:


7-Patient Refused.


9-Not Applicable-not attempted and the patient did not perform the activity 

before the current illness, exacerbation or injury.


10-Not Attempted due to Environmental Limitations-(lack of equipment, weather 

restraints, etc.).


88-Not Attempted due to Medical Conditions or Safety Concerns.


Lying to Sitting/Side of Bed(Q:  6


Sit to Stand (QC):  5


Chair/Bed-to-Chair Xfer(QC):  5





Weight Bearing


Right Lower Extremity:  Right


Full Weight Bearing


Left Lower Extremity:  Left


Weight Bearing/Tolerated





Gait Training


Distance:  300'


Walk 10 feet (QC):  5


Walk 50 ft with 2 Turns(QC):  5


Walk 150 ft (QC):  5


Gait Assistive Device:  FWW


improved gait pattern and body placement in FWW with reciprocal pattern.





Exercises


Supine Ex:  Ankle pumps, Quad Set, Heel Slides, Straight leg raise


Supine Reps:  12


Seated Therapy Exercises:  Long arc quads


Seated Reps:  15





Assessment


Patient much improved on this date.  Patient educated on performing HEP if 

returning to home with home health.  Patient voices understanding.  Patient up 

in recliner with breakfast in situ.  left knee AAROM 5-80 degrees





PT Long Term Goals


Long Term Goals


PT Long Term Goals Time Frame:  Dec 7, 2022


Roll Left & Right (QC):  6


Sit to Lying (QC):  6


Lying-Sitting on Side/Bed(QC):  6


Sit to Stand (QC):  6


Chair/Bed-to-Chair Xfer(QC):  6


Toilet Transfer (QC):  6


Does the Patient Walk:  Yes


Walk 10 feet (QC):  6


Walk 50ft with 2 Turns (QC):  6


Walk 150 ft (QC):  6





PT Plan


Treatment/Plan


Treatment Plan:  Continue Plan of Care


Treatment Plan:  Bed Mobility, Education, Functional Activity Andres, Functional 

Strength, Gait, Safety, Therapeutic Exercise, Transfers


Treatment Duration:  Dec 7, 2022


Frequency:  11 times per week


Estimated Hrs Per Day:  .25 hour per day


Patient and/or Family Agrees t:  Yes





Time


Time In:  740


Time Out:  805


DATE:  Dec 2, 2022


Total Billed Treatment Time:  25


Total Billed Treatment


1 visit


EX 15 min


GT 10 min











LYNDA MCKEON PT               Dec 2, 2022 09:19

## 2022-12-02 NOTE — DIAGNOSTIC IMAGING REPORT
Indication:  Hypoxia.



Compared with study of 12/01/2022 



Findings:  Right perihilar discoid subsegmental atelectasis

stable, patchy atelectasis in the left base improved.  No adverse

development.  No failure pattern.



Impression:  Reduction in left basilar atelectasis, no adverse

change.



Dictated by: 



  Dictated on workstation # [id number]

## 2022-12-03 NOTE — DISCHARGE SUMMARY
DIAGNOSES:

1.  Left knee primary osteoarthritis.

2.  Inflammatory arthritis.



PROCEDURE:

Left total knee arthroplasty.



SUMMARY OF HOSPITAL COURSE:

The patient is a 72-year-old female who underwent a left total knee arthroplasty

the day of admission.  Postoperatively, she had confusion on postoperative day 

#1, but on postoperative day #2, had returned to her baseline status. At the 

time of discharge, her wound was clean and dry.  She had no calf tenderness.  

Negative Homans sign.  She is tolerating a diet well and tolerating pain with 

oral pain medication.



CONDITION AT DISCHARGE:

Good.



DISCHARGE DIET:

Regular.



DISPOSITION:

Transferred to inpatient rehabilitation unit for continued physical and 

occupational therapy.







Job ID: [id number]

DocumentID: [id number]

Dictated Date: 12/02/2022 07:12:43

Transcription Date: 12/03/2022 05:48:00

Dictated By: GUSTAVO WOODWARD MD

## 2023-01-30 ENCOUNTER — HOSPITAL ENCOUNTER (OUTPATIENT)
Dept: HOSPITAL 75 - REHAB | Age: 73
LOS: 1 days | Discharge: HOME | End: 2023-01-31
Attending: ORTHOPAEDIC SURGERY
Payer: MEDICARE

## 2023-01-30 DIAGNOSIS — Z47.1: Primary | ICD-10-CM

## 2023-01-30 DIAGNOSIS — R26.89: ICD-10-CM

## 2023-01-30 DIAGNOSIS — Z96.652: ICD-10-CM

## 2023-02-27 ENCOUNTER — HOSPITAL ENCOUNTER (OUTPATIENT)
Dept: HOSPITAL 75 - REHAB | Age: 73
LOS: 1 days | Discharge: HOME | End: 2023-02-28
Attending: ORTHOPAEDIC SURGERY
Payer: MEDICARE

## 2023-02-27 DIAGNOSIS — Z47.1: Primary | ICD-10-CM

## 2023-02-27 DIAGNOSIS — Z96.652: ICD-10-CM

## 2023-03-03 ENCOUNTER — HOSPITAL ENCOUNTER (OUTPATIENT)
Dept: HOSPITAL 75 - REHAB | Age: 73
Discharge: HOME | End: 2023-03-03
Attending: ORTHOPAEDIC SURGERY
Payer: MEDICARE

## 2023-03-03 DIAGNOSIS — Z47.1: Primary | ICD-10-CM

## 2023-03-03 DIAGNOSIS — Z96.652: ICD-10-CM

## 2023-04-13 NOTE — PHYSICAL THERAPY PRE-OP EVAL
PT Pre-Surgical Assessment


Type of Surgery


Type of Surgery:  


left TKR





Prior Level of Function


Current Living Status:  Alone


Locomotion     (Upon Admit):  Front Wheeled Walker


PLOF DME:  Front Wheeled Walker





Subjective


Home:  Apartment


Current Living Status:  Alone


Entry Into Home:  Level Entry





Motor Control


Motor Control:  Motor Control WNL





ROM


ROM:  WFL, except focal deficit





Strength


Strength:  WFL





Transfers


Transfers (B, C, W/C) (FIM):  6





Gait


Gait (FIM):  6


Gait Distance (FIM):  6


Distance:  150'


Gait Assistive Device:  FWW





Treatment Rendered


Treatment: Patient instructed in assistive device, supported ambulation.


Patient instructed in and given written program of ROM and strengthening 

exercises to be preformed post-op.


Patient instructed in movement precautions where applicable.


Patient demonstrates understandings of post-operative therapy protocol including

gait pattern and exercise program.


Pre-operative instruction completed; await physical therapy orders after 

surgery.


Treatment Goal Met:  Yes





Assessment


Goals Acheived:  I Ambulation w/ FWW, Understands P-op Precaut, I Post-op 

Exercises





Charges/GCodes


Time In:  1355


Time Out:  1405


Total Billed Treatment Time:  10


Total Billed Treatment


1 Educ











LYNDA MCKEON PT              Nov 22, 2022 14:52
Samples Given: [place] Posay Clear Face Moisturizer with SPF
Detail Level: Zone